# Patient Record
Sex: FEMALE | Race: WHITE | NOT HISPANIC OR LATINO | Employment: UNEMPLOYED | URBAN - METROPOLITAN AREA
[De-identification: names, ages, dates, MRNs, and addresses within clinical notes are randomized per-mention and may not be internally consistent; named-entity substitution may affect disease eponyms.]

---

## 2017-10-11 ENCOUNTER — HOSPITAL ENCOUNTER (EMERGENCY)
Facility: HOSPITAL | Age: 57
Discharge: HOME | End: 2017-10-11
Payer: COMMERCIAL

## 2017-10-11 VITALS
BODY MASS INDEX: 24.8 KG/M2 | HEIGHT: 63 IN | TEMPERATURE: 98 F | OXYGEN SATURATION: 98 % | RESPIRATION RATE: 16 BRPM | WEIGHT: 140 LBS | DIASTOLIC BLOOD PRESSURE: 76 MMHG | HEART RATE: 72 BPM | SYSTOLIC BLOOD PRESSURE: 176 MMHG

## 2017-10-11 DIAGNOSIS — M50.30 DEGENERATIVE DISC DISEASE, CERVICAL: Primary | ICD-10-CM

## 2017-10-11 DIAGNOSIS — M54.2 NECK PAIN: ICD-10-CM

## 2017-10-11 PROCEDURE — 63600175 PHARM REV CODE 636 W HCPCS: Performed by: EMERGENCY MEDICINE

## 2017-10-11 PROCEDURE — 96372 THER/PROPH/DIAG INJ SC/IM: CPT

## 2017-10-11 PROCEDURE — 99284 EMERGENCY DEPT VISIT MOD MDM: CPT | Mod: 25

## 2017-10-11 RX ORDER — KETOROLAC TROMETHAMINE 30 MG/ML
30 INJECTION, SOLUTION INTRAMUSCULAR; INTRAVENOUS
Status: COMPLETED | OUTPATIENT
Start: 2017-10-11 | End: 2017-10-11

## 2017-10-11 RX ORDER — METHYLPREDNISOLONE ACETATE 40 MG/ML
80 INJECTION, SUSPENSION INTRA-ARTICULAR; INTRALESIONAL; INTRAMUSCULAR; SOFT TISSUE
Status: COMPLETED | OUTPATIENT
Start: 2017-10-11 | End: 2017-10-11

## 2017-10-11 RX ORDER — TRAMADOL HYDROCHLORIDE 50 MG/1
50 TABLET ORAL EVERY 6 HOURS PRN
Qty: 20 TABLET | Refills: 0 | Status: SHIPPED | OUTPATIENT
Start: 2017-10-11 | End: 2017-10-21

## 2017-10-11 RX ADMIN — KETOROLAC TROMETHAMINE 30 MG: 30 INJECTION, SOLUTION INTRAMUSCULAR at 06:10

## 2017-10-11 RX ADMIN — METHYLPREDNISOLONE ACETATE 80 MG: 40 INJECTION, SUSPENSION INTRA-ARTICULAR; INTRALESIONAL; INTRAMUSCULAR; SOFT TISSUE at 06:10

## 2017-10-11 NOTE — ED PROVIDER NOTES
Encounter Date: 10/11/2017    SCRIBE #1 NOTE: I, Patricia Deena, am scribing for, and in the presence of, Dr. Soni.       History     Chief Complaint   Patient presents with    Neck Pain     pt c/o chronic neck pain that increased 2 days ago.      Time seen by provider: 6:30 PM    This is a 57 y.o. female with a PMHx of HTN and methadone dependence who presents with complaint of neck pain. The patient has a chronic neck injury. It has recently worsened over the last month, this pain has not been evaluated by orthopedics. The patient describes the pain as sharp pain in the left side of her neck and left shoulder. The patient denies any numbness or tingling in the fingers of her left arm. She does not feel as though her LUE is weak. The patient has no other complaints at this time.       The history is provided by the patient.     Review of patient's allergies indicates:  No Known Allergies  Past Medical History:   Diagnosis Date    Hypertension     Methadone dependence      Past Surgical History:   Procedure Laterality Date    CHOLECYSTECTOMY  5/18/14     Laparoscopic Cholecystectomy 5/18/14 [facility]    MASS EXCISION Right 1998    Thigh fatty cyst     History reviewed. No pertinent family history.  Social History   Substance Use Topics    Smoking status: Current Every Day Smoker     Packs/day: 0.25    Smokeless tobacco: Never Used    Alcohol use No     Review of Systems   Constitutional: Negative for chills and fever.   HENT: Negative for facial swelling.    Eyes: Negative for visual disturbance.   Respiratory: Negative for shortness of breath.    Cardiovascular: Negative for leg swelling.   Gastrointestinal: Negative for abdominal distention.   Genitourinary: Negative for hematuria.   Musculoskeletal: Positive for myalgias, neck pain and neck stiffness. Negative for back pain.   Skin: Negative for rash.   Neurological: Negative for speech difficulty, weakness and numbness.       Physical Exam      Initial Vitals [10/11/17 1703]   BP Pulse Resp Temp SpO2   (!) 152/69 71 18 98.1 °F (36.7 °C) 95 %      MAP       96.67         Physical Exam    Nursing note and vitals reviewed.  Constitutional: She appears well-developed and well-nourished. She is not diaphoretic. No distress.   HENT:   Head: Normocephalic and atraumatic.   Eyes: Conjunctivae are normal. No scleral icterus.   Neck: Normal range of motion. Neck supple.   Cardiovascular: Normal rate and intact distal pulses.   Pulmonary/Chest: No respiratory distress. She has no wheezes.   Abdominal: Soft. She exhibits no distension.   Musculoskeletal: Normal range of motion. She exhibits tenderness. She exhibits no edema.   Some left cervical paraspinal and trapezius tenderness. No thoracic or lumbar spinal tenderness. No midline tenderness.    Neurological: She is alert and oriented to person, place, and time. She has normal strength. No cranial nerve deficit or sensory deficit.   Strength and sensation to light touch intact.    Skin: Skin is warm and dry.   Psychiatric: She has a normal mood and affect.         ED Course   Procedures  Labs Reviewed - No data to display          Medical Decision Making:   History:   Old Medical Records: I decided to obtain old medical records.  Independently Interpreted Test(s):   I have ordered and independently interpreted X-rays - see summary below.       <> Summary of X-Ray Reading(s): Cervical x-rays independently interpreted by me demonstrate diffuse degenerative disc disease.  Clinical Tests:   Radiological Study: Ordered and Reviewed  ED Management:  Yu Meza is a 57 y.o. female who presents with  acute worsening of chronic neck pain.  She has no focal neurologic deficit; however, she does now have radicular symptoms x-rays revealed diffuse degenerative changes.  She is given Depo-Medrol and tramadol with referral to orthopedic surgery.                   ED Course      Clinical Impression:   The  encounter diagnosis was Neck pain.    Disposition:   Disposition: Discharged  Condition: Stable                        Byron Soni III, MD  10/11/17 5405

## 2017-10-12 NOTE — ED NOTES
Pt c/o chronic neck pain that has worsened in the past month, unrelieved per ibuprofen.  Pt denies trauma.

## 2017-10-16 ENCOUNTER — HOSPITAL ENCOUNTER (EMERGENCY)
Facility: HOSPITAL | Age: 57
Discharge: HOME | End: 2017-10-16
Attending: EMERGENCY MEDICINE
Payer: COMMERCIAL

## 2017-10-16 VITALS
OXYGEN SATURATION: 97 % | HEIGHT: 63 IN | HEART RATE: 113 BPM | SYSTOLIC BLOOD PRESSURE: 151 MMHG | TEMPERATURE: 97 F | WEIGHT: 150 LBS | BODY MASS INDEX: 26.58 KG/M2 | DIASTOLIC BLOOD PRESSURE: 89 MMHG | RESPIRATION RATE: 20 BRPM

## 2017-10-16 DIAGNOSIS — M62.838 SPASM OF CERVICAL PARASPINOUS MUSCLE: Primary | ICD-10-CM

## 2017-10-16 PROCEDURE — 25000003 PHARM REV CODE 250: Performed by: EMERGENCY MEDICINE

## 2017-10-16 PROCEDURE — 99283 EMERGENCY DEPT VISIT LOW MDM: CPT

## 2017-10-16 RX ORDER — CYCLOBENZAPRINE HCL 10 MG
10 TABLET ORAL
Status: COMPLETED | OUTPATIENT
Start: 2017-10-16 | End: 2017-10-16

## 2017-10-16 RX ORDER — CYCLOBENZAPRINE HCL 10 MG
10 TABLET ORAL 3 TIMES DAILY PRN
Qty: 15 TABLET | Refills: 0 | Status: SHIPPED | OUTPATIENT
Start: 2017-10-16 | End: 2017-10-21

## 2017-10-16 RX ORDER — OXYCODONE AND ACETAMINOPHEN 5; 325 MG/1; MG/1
2 TABLET ORAL
Status: COMPLETED | OUTPATIENT
Start: 2017-10-16 | End: 2017-10-16

## 2017-10-16 RX ADMIN — CYCLOBENZAPRINE HYDROCHLORIDE 10 MG: 10 TABLET, FILM COATED ORAL at 09:10

## 2017-10-16 RX ADMIN — OXYCODONE AND ACETAMINOPHEN 2 TABLET: 5; 325 TABLET ORAL at 09:10

## 2017-10-17 NOTE — ED TRIAGE NOTES
pt c/o neck spasms that began today.  Pt states she has chronic neck pain.  Pt denies trauma. Pt denies CP or SOB.  Increased pain with movement

## 2017-10-17 NOTE — ED PROVIDER NOTES
Encounter Date: 10/16/2017       History     Chief Complaint   Patient presents with    Neck Pain     pt c/o neck spasms that began today.  Pt states she has chronic neck pain.  Pt denies trauma. Pt denies CP or SOB     The patient presents emergency department with severe neck pain and spasm.  The patient states she was here last Thursday, 5 days ago and had a plain film x-ray that time.  She states she was told that she had bulging disks.  She was given Ultram.  The patient states that she is on methadone.  She used to have muscle relaxers but she is out of those.  No trauma.  No numbness tingling or weakness to any extremities.  No headache.          Review of patient's allergies indicates:  No Known Allergies  Past Medical History:   Diagnosis Date    Hypertension     Methadone dependence      Past Surgical History:   Procedure Laterality Date    CHOLECYSTECTOMY  5/18/14     [facility]oscopic [facility]cyst[facility] 5/18/14 [facility]    MASS EXCISION Right 1998    Thigh fatty cyst     History reviewed. No pertinent family history.  Social History   Substance Use Topics    Smoking status: Current Every Day Smoker     Packs/day: 0.25    Smokeless tobacco: Never Used    Alcohol use No     Review of Systems   Constitutional: Negative for fever.   HENT: Negative for sore throat.    Respiratory: Negative for shortness of breath.    Cardiovascular: Negative for chest pain.   Gastrointestinal: Negative for nausea.   Genitourinary: Negative for dysuria.   Musculoskeletal: Negative for back pain.   Skin: Negative for rash.   Neurological: Negative for weakness.   Hematological: Does not bruise/bleed easily.       Physical Exam     Initial Vitals [10/16/17 2006]   BP Pulse Resp Temp SpO2   (!) 151/89 (!) 113 20 97.1 °F (36.2 °C) 97 %      MAP       109.67         Physical Exam    Nursing note and vitals reviewed.  Constitutional: She appears well-developed and well-nourished.   HENT:   Head: Normocephalic and atraumatic.    Neck: Neck supple.   Severe muscular tenderness to the left paraspinous muscles and the left trapezius muscles.  Decreased range of motion when turning her head from side to side   Abdominal: Soft. There is no tenderness.   Musculoskeletal: Normal range of motion.   Lymphadenopathy:     She has no cervical adenopathy.   Neurological: She is alert and oriented to person, place, and time. She has normal strength.   Skin: Skin is warm and dry.   Psychiatric: She has a normal mood and affect. Her behavior is normal. Judgment and thought content normal.         ED Course   Procedures  Labs Reviewed - No data to display          Medical Decision Making:   ED Management:  Patient was given Percocet 10 mg and Flexeril 10 mg.  She was given a copy of her previous C-spine x-ray report.  She'll be given a prescription for Flexeril to take at home.                   ED Course      Clinical Impression:   The encounter diagnosis was Spasm of cervical paraspinous muscle.                           Gardenia Perez MD  10/16/17 3434

## 2017-11-08 ENCOUNTER — TELEPHONE (OUTPATIENT)
Dept: SPINE | Facility: CLINIC | Age: 57
End: 2017-11-08

## 2017-11-08 DIAGNOSIS — M54.2 CERVICALGIA: Primary | ICD-10-CM

## 2017-11-10 ENCOUNTER — HOSPITAL ENCOUNTER (OUTPATIENT)
Dept: RADIOLOGY | Facility: OTHER | Age: 57
Discharge: HOME | End: 2017-11-10
Attending: PHYSICIAN ASSISTANT
Payer: MEDICAID

## 2017-11-10 ENCOUNTER — OFFICE VISIT (OUTPATIENT)
Dept: SPINE | Facility: CLINIC | Age: 57
End: 2017-11-10
Payer: MEDICAID

## 2017-11-10 VITALS
BODY MASS INDEX: 26.58 KG/M2 | DIASTOLIC BLOOD PRESSURE: 77 MMHG | SYSTOLIC BLOOD PRESSURE: 161 MMHG | HEART RATE: 80 BPM | HEIGHT: 63 IN | WEIGHT: 150 LBS

## 2017-11-10 DIAGNOSIS — M50.30 DEGENERATION OF CERVICAL INTERVERTEBRAL DISC: ICD-10-CM

## 2017-11-10 DIAGNOSIS — M47.812 CERVICAL SPONDYLOSIS WITHOUT MYELOPATHY: ICD-10-CM

## 2017-11-10 DIAGNOSIS — M54.2 NECK PAIN: ICD-10-CM

## 2017-11-10 DIAGNOSIS — M43.10 ACQUIRED SPONDYLOLISTHESIS: ICD-10-CM

## 2017-11-10 DIAGNOSIS — M54.2 CERVICALGIA: ICD-10-CM

## 2017-11-10 PROCEDURE — 99999 PR PBB SHADOW E&M-EST. PATIENT-LVL IV: CPT | Mod: PBBFAC,,, | Performed by: PHYSICIAN ASSISTANT

## 2017-11-10 PROCEDURE — 99204 OFFICE O/P NEW MOD 45 MIN: CPT | Mod: S$PBB,,, | Performed by: PHYSICIAN ASSISTANT

## 2017-11-10 PROCEDURE — 72040 X-RAY EXAM NECK SPINE 2-3 VW: CPT | Mod: 26,,, | Performed by: RADIOLOGY

## 2017-11-10 PROCEDURE — 99214 OFFICE O/P EST MOD 30 MIN: CPT | Mod: PBBFAC,25 | Performed by: PHYSICIAN ASSISTANT

## 2017-11-10 PROCEDURE — 72040 X-RAY EXAM NECK SPINE 2-3 VW: CPT | Mod: TC

## 2017-11-10 RX ORDER — ESTRADIOL 10 UG/1
INSERT VAGINAL
COMMUNITY

## 2017-11-10 RX ORDER — LISINOPRIL 10 MG/1
10 TABLET ORAL DAILY
COMMUNITY

## 2017-11-10 RX ORDER — DICLOFENAC SODIUM 75 MG/1
75 TABLET, DELAYED RELEASE ORAL 2 TIMES DAILY PRN
Qty: 180 TABLET | Refills: 0 | Status: SHIPPED | OUTPATIENT
Start: 2017-11-10

## 2017-11-10 RX ORDER — ESCITALOPRAM OXALATE 10 MG/1
10 TABLET ORAL DAILY
COMMUNITY

## 2017-11-10 RX ORDER — METHOCARBAMOL 750 MG/1
750 TABLET, FILM COATED ORAL 3 TIMES DAILY PRN
Qty: 270 TABLET | Refills: 0 | Status: SHIPPED | OUTPATIENT
Start: 2017-11-10 | End: 2017-12-10

## 2017-11-10 RX ORDER — METHYLPREDNISOLONE 4 MG/1
TABLET ORAL
Qty: 1 PACKAGE | Refills: 0 | Status: ON HOLD | OUTPATIENT
Start: 2017-11-10 | End: 2019-11-12 | Stop reason: HOSPADM

## 2017-11-10 NOTE — PROGRESS NOTES
Subjective:     Patient ID:  Yu Meza is a 57 y.o. female.    Self-Referral    Chief Complaint: Neck pain    HPI    Yu Meza is a 57 y.o. female who presents with the above CC.  Patient has had neck pain for the past 2 years that has gotten progressively worse over the past three months.  Pain is constant rated 7/10 and is worse with flexion and turning and no position makes it better.  Pain radiates to the left shoulder region.  No arm pain.    Patient has not had PT or ESIs.  No spine surgery.  Patient is currently taking OTC ibuprofen and tylenol.  Gabapentin 300mg once a day which helps.  She has been taking methadone for the past 30 years and goes to the [facility] clinic on the [facility].      Patient denies any recent accidents or trauma, no saddle anesthesias, and no bowel or bladder incontinence.    Patient denies any difficulty with balance or gait, no difficulty tying shoes or buttoning clothes, is occasionally dropping things, does not have difficulty opening containers, and has had no change in handwriting.      Review of Systems:  Please refer to page three of the spine center intake form for a complete review of systems.    Past Medical History:   Diagnosis Date    Hypertension     Methadone dependence      Past Surgical History:   Procedure Laterality Date    CHOLECYSTECTOMY  5/18/14     Laparoscopic Cholecystectomy 5/18/14 [facility] [place]    MASS EXCISION Right 1998    Thigh fatty cyst     Current Outpatient Prescriptions on File Prior to Visit   Medication Sig Dispense Refill    methadone (DOLOPHINE) 5 MG tablet Take 90 mg by mouth once daily.       [DISCONTINUED] ibuprofen (ADVIL,MOTRIN) 800 MG tablet Take 1 tablet (800 mg total) by mouth every 6 (six) hours as needed for Pain. 30 tablet 1     No current facility-administered medications on file prior to visit.      Review of patient's allergies indicates:  No Known Allergies  Social History     Social History     "Marital status:      Spouse name: N/A    Number of children: N/A    Years of education: N/A     Occupational History    Not on file.     Social History Main Topics    Smoking status: Current Every Day Smoker     Packs/day: 0.25    Smokeless tobacco: Never Used    Alcohol use No    Drug use: No    Sexual activity: Yes     Partners: Male     Other Topics Concern    Not on file     Social History Narrative    No narrative on file     History reviewed. No pertinent family history.    Objective:      Vitals:    11/10/17 0850   BP: (!) 161/77   Pulse: 80   Weight: 68 kg (150 lb)   Height: 5' 3" (1.6 m)   PainSc:   7   PainLoc: Neck         Physical Exam:    General:  Yu Meza is well-developed, well-nourished, appears stated age, in no acute distress, alert and oriented to person, place, and time.    Pulmonary/Chest:  Respiratory effort normal  Abdominal: Exhibits no distension  Psychiatric:  Normal mood and affect.  Behavior is normal.  Judgement and thought content normal    Musculoskeletal:    Patient arises from a sitting to standing position without difficulty.  Patient walks to the door without evidence of limp, pain, or abnormality of gait. Patient is able to walk heel to toe without difficulty.    Cervical ROM:   Pain in cervical spine flexion, extension, right lateral bending, left lateral bending.  No pain in right rotation and left rotation.    Cervical Spine Inspection:  Normal with no surgical scars with no visible rashes.    Cervical Spine Palpation:  No tenderness to cervical spine palpation.    Neurological: Alert and oriented to person, place, and time    Muscle strength against resistance:     Right Left   Deltoid  5 / 5 5 / 5   Biceps  5 / 5 5 / 5   Triceps 5 / 5 5 / 5   Wrist flexion  5 / 5 5 / 5   Wrist extension 5 / 5 5 / 5   Finger abduction 5 / 5 5 / 5   Thumb opposition 5 / 5 5 / 5   Handgrip 5 / 5 5 / 5   Hip flexion  5 / 5 5 / 5   Hip extension 5 / 5 5 / 5   Hip " abduction 5 / 5 5 / 5   Hip adduction 5/ 5 5 / 5   Knee extension  5 / 5 5 / 5   Knee flexion  5 / 5 5 / 5   Dorsiflexion  5 / 5 5 / 5   EHL  5 / 5 5 / 5   Plantar flexion  5 / 5 5 / 5   Inversion of the feet 5 / 5 5 / 5   Eversion of the feet 5 / 5 5 / 5     Reflexes:     Right Left   Triceps 2+ 2+   Biceps 2+ 2+   Brachioradialis 2+ 2+   Patellar 2+ 2+   Achilles 2+ 2+     Babinski: Negative bilaterally  Clonus:  Negative bilaterally  Torres: Positive bilaterally    On gross examination of the bilateral lower extremities, patient has full painfree ROM with no signs of clubbing, cyanosis, edema, and weakness.     XRAY Interpretation:     Cervical spine ap/lateral/flexion/extension xrays were personally reviewed today.  No fractures.  Movement at C3-4 and C4-5 on flexion and extension and grade one spondylolisthesis.   DDD at C5-6 and C6-7.  Multilevel spondylosis.    Assessment:          1. Cervical spondylosis without myelopathy    2. Degeneration of cervical intervertebral disc    3. Neck pain    4. Acquired spondylolisthesis            Plan:          Orders Placed This Encounter    MRI Cervical Spine Without Contrast    Ambulatory referral to Physical Therapy - Cervical    methylPREDNISolone (MEDROL DOSEPACK) 4 mg tablet    methocarbamol (ROBAXIN) 750 MG Tab    diclofenac (VOLTAREN) 75 MG EC tablet       C3-4, C4-5 grade one spondylolisthesis with movement.  DDD/spondylosis at C5-6 and C6-7    -MRI cervical spine  -PT through [facility]  -Medrol pack now with food  -Robaxin PRN with food  -Diclofenac PRN with food once the medrol pack is done  -She was told to ask her PCP about these medication interacting with methadone and harvoni treatment before she starts to take them and she verbalized understanding  -Fu after MRI    Follow-Up:  Return in 2 weeks (on 11/24/2017). If there are any questions prior to this, the patient was instructed to contact the office.       Thea Early [facility],  WILLIAN  Neurosurgery  [facility]  [facility]

## 2017-11-15 ENCOUNTER — HOSPITAL ENCOUNTER (OUTPATIENT)
Dept: RADIOLOGY | Facility: HOSPITAL | Age: 57
Discharge: HOME | End: 2017-11-15
Attending: PHYSICIAN ASSISTANT
Payer: MEDICAID

## 2017-11-15 DIAGNOSIS — M54.2 NECK PAIN: ICD-10-CM

## 2017-11-15 DIAGNOSIS — M43.10 ACQUIRED SPONDYLOLISTHESIS: ICD-10-CM

## 2017-11-15 DIAGNOSIS — M50.30 DEGENERATION OF CERVICAL INTERVERTEBRAL DISC: ICD-10-CM

## 2017-11-15 DIAGNOSIS — M47.812 CERVICAL SPONDYLOSIS WITHOUT MYELOPATHY: ICD-10-CM

## 2017-11-15 PROCEDURE — 72141 MRI NECK SPINE W/O DYE: CPT | Mod: TC

## 2017-11-15 PROCEDURE — 72141 MRI NECK SPINE W/O DYE: CPT | Mod: 26,,, | Performed by: RADIOLOGY

## 2017-12-05 ENCOUNTER — TELEPHONE (OUTPATIENT)
Dept: SPINE | Facility: CLINIC | Age: 57
End: 2017-12-05

## 2017-12-05 NOTE — TELEPHONE ENCOUNTER
----- Message from Korin Grant sent at 12/5/2017  2:24 PM CST -----  X_  1st Request  _  2nd Request  _  3rd Request        Who:CHRISTINA CLAY [[id number]]     Why: Requesting a call back in regards to getting the results of her MRI. Please call to discuss.    What Number to Call Back:[phone]    When to Expect a call back: (Within 24 hours)    Please return the call at earliest convenience. Thanks!

## 2017-12-06 NOTE — TELEPHONE ENCOUNTER
Per my last clinic note it says FU after MRI.    Thea Early, PAULINA, PA-C  Neurosurgery  [facility] Sp[facility]  [facility]

## 2017-12-12 ENCOUNTER — OFFICE VISIT (OUTPATIENT)
Dept: SPINE | Facility: CLINIC | Age: 57
End: 2017-12-12
Payer: COMMERCIAL

## 2017-12-12 ENCOUNTER — TELEPHONE (OUTPATIENT)
Dept: NEUROSURGERY | Facility: CLINIC | Age: 57
End: 2017-12-12

## 2017-12-12 VITALS
WEIGHT: 150 LBS | HEIGHT: 63 IN | HEART RATE: 81 BPM | SYSTOLIC BLOOD PRESSURE: 132 MMHG | DIASTOLIC BLOOD PRESSURE: 71 MMHG | BODY MASS INDEX: 26.58 KG/M2

## 2017-12-12 DIAGNOSIS — M47.812 CERVICAL SPONDYLOSIS WITHOUT MYELOPATHY: ICD-10-CM

## 2017-12-12 DIAGNOSIS — M50.20 HNP (HERNIATED NUCLEUS PULPOSUS), CERVICAL: Primary | ICD-10-CM

## 2017-12-12 DIAGNOSIS — M54.2 NECK PAIN: ICD-10-CM

## 2017-12-12 DIAGNOSIS — M48.02 CERVICAL STENOSIS OF SPINAL CANAL: ICD-10-CM

## 2017-12-12 DIAGNOSIS — M50.30 DEGENERATION OF CERVICAL INTERVERTEBRAL DISC: ICD-10-CM

## 2017-12-12 DIAGNOSIS — M43.10 ACQUIRED SPONDYLOLISTHESIS: ICD-10-CM

## 2017-12-12 PROCEDURE — 99214 OFFICE O/P EST MOD 30 MIN: CPT | Mod: S$GLB,,, | Performed by: PHYSICIAN ASSISTANT

## 2017-12-12 PROCEDURE — 99999 PR PBB SHADOW E&M-EST. PATIENT-LVL IV: CPT | Mod: PBBFAC,,, | Performed by: PHYSICIAN ASSISTANT

## 2017-12-12 RX ORDER — CYCLOBENZAPRINE HCL 10 MG
10 TABLET ORAL 3 TIMES DAILY PRN
Qty: 90 TABLET | Refills: 2 | Status: SHIPPED | OUTPATIENT
Start: 2017-12-12 | End: 2017-12-22

## 2017-12-12 NOTE — PROGRESS NOTES
"Subjective:     Patient ID:  Yu Meza is a 57 y.o. female.      Chief Complaint:     HPI    Yu Meza is a 57 y.o. female who presents for MRI follow up.    Patient has had neck pain for the past 2 years that has gotten progressively worse over the past three months.  Pain is constant rated 7/10 and is worse with flexion and turning and no position makes it better.  Pain radiates to the left shoulder region.  No arm pain.     Patient has not had PT or ESIs.  No spine surgery.  Patient is currently taking OTC ibuprofen and tylenol.  Gabapentin 300mg once a day which helps.  She has been taking methadone for the past 30 years and goes [facility] [place] [facility] [place] [facility]  She was aftraid to start the diclofenac for fear of stroke.  The medrol pack did not help much.  The Robaxin gave her restless leg.    She has not started the PT yet.     Patient denies any recent accidents or trauma, no saddle anesthesias, and no bowel or bladder incontinence.     Patient denies any difficulty with balance or gait, no difficulty tying shoes or buttoning clothes, is occasionally dropping things, does not have difficulty opening containers, and has had no change in handwriting.     Review of Systems:  Constitution: Negative for chills, fever, night sweats and weight loss.   Musculoskeletal: Negative for falls.   Gastrointestinal: Negative for bowel incontinence, nausea and vomiting.   Genitourinary: Negative for bladder incontinence.   Neurological: Negative for disturbances in coordination and loss of balance.      Objective:      Vitals:    12/12/17 1016   BP: 132/71   Pulse: 81   Weight: 68 kg (150 lb)   Height: 5' 3" (1.6 m)   PainSc:   7   PainLoc: Neck         Physical Exam:    General:  Yu Meza is well-developed, well-nourished, appears stated age, in no acute distress, alert and oriented to person, place, and time.    Patient sits comfortably in the exam room and answers questions " appropriately. Grossly patient is able to move bilateral upper extremities without difficulty.     XRAY Interpretation:      Cervical spine ap/lateral/flexion/extension xrays were personally reviewed today.  No fractures.  Movement at C3-4 and C4-5 on flexion and extension and grade one spondylolisthesis.   DDD at C5-6 and C6-7.  Multilevel spondylosis.    MRI Interpretation:     Cervical spine MRI was personally reviewed today. C3-4 and C4-5 grade one spondylolisthesis.   DDD at C5-6 and C6-7.  Multilevel spondylosis.  There is central stenosis with spinal cord compression at C3-4 to C5-6 with HNP at those levels        Assessment:          1. HNP (herniated nucleus pulposus), cervical    2. Cervical spondylosis without myelopathy    3. Degeneration of cervical intervertebral disc    4. Cervical stenosis of spinal canal    5. Neck pain    6. Acquired spondylolisthesis            Plan:          Orders Placed This Encounter    Procedure Order to [facility]    cyclobenzaprine (FLEXERIL) 10 MG tablet     Movement at C3-4 and C4-5 on flexion and extension and grade one spondylolisthesis. Central stenosis with spinal cord compression at C3-4 to C5-6 with HNP at those levels.    -Start PT  -Start Flexeril PRN  -Start Diclofenac PRN with food  -C7-T1 IL PAULY through the pain clinic  -Refer to Dr. Sr for surgical recommendations      Follow-Up:  Return if symptoms worsen or fail to improve. If there are any questions prior to this, the patient was instructed to contact the office.       Thea Early, [facility], PA-C  Neurosurgery  Back and Spine Center  [facility]

## 2017-12-12 NOTE — TELEPHONE ENCOUNTER
Called patient , patient did not answer left message on voicemail     ----- Message from Yanet Ramesh RN sent at 12/12/2017  3:05 PM CST -----  Please call and let them know the appt has been scheduled for them by request of KENYATTA Hong. Letter has been printed and mailed.  ----- Message -----  From: Yeison Sr MD  Sent: 12/12/2017   1:49 PM  To: Yanet Ramesh RN    Ok sounds good. Thanks    ----- Message -----  From: Yanet Ramesh RN  Sent: 12/12/2017  11:02 AM  To: Yeison Sr MD    Please see below.  ----- Message -----  From: Astrid Llanos  Sent: 12/12/2017  10:45 AM  To: [facility] Staff    Please schedule with in 3 weeks for cervical spine surgery consult. Thank you

## 2017-12-27 ENCOUNTER — SURGERY (OUTPATIENT)
Age: 57
End: 2017-12-27

## 2017-12-27 ENCOUNTER — HOSPITAL ENCOUNTER (OUTPATIENT)
Facility: OTHER | Age: 57
Discharge: HOME | End: 2017-12-27
Attending: ANESTHESIOLOGY | Admitting: ANESTHESIOLOGY
Payer: MEDICAID

## 2017-12-27 VITALS
HEART RATE: 79 BPM | TEMPERATURE: 98 F | BODY MASS INDEX: 26.58 KG/M2 | SYSTOLIC BLOOD PRESSURE: 113 MMHG | RESPIRATION RATE: 18 BRPM | DIASTOLIC BLOOD PRESSURE: 61 MMHG | WEIGHT: 150 LBS | OXYGEN SATURATION: 95 % | HEIGHT: 63 IN

## 2017-12-27 DIAGNOSIS — M54.16 LUMBAR RADICULOPATHY: ICD-10-CM

## 2017-12-27 DIAGNOSIS — M54.12 CERVICAL RADICULOPATHY: Primary | ICD-10-CM

## 2017-12-27 PROCEDURE — 62321 NJX INTERLAMINAR CRV/THRC: CPT | Mod: ,,, | Performed by: ANESTHESIOLOGY

## 2017-12-27 PROCEDURE — 62321 NJX INTERLAMINAR CRV/THRC: CPT | Performed by: ANESTHESIOLOGY

## 2017-12-27 PROCEDURE — 25000003 PHARM REV CODE 250: Performed by: ANESTHESIOLOGY

## 2017-12-27 PROCEDURE — 25500020 PHARM REV CODE 255: Performed by: ANESTHESIOLOGY

## 2017-12-27 PROCEDURE — 63600175 PHARM REV CODE 636 W HCPCS: Performed by: ANESTHESIOLOGY

## 2017-12-27 PROCEDURE — 62320 NJX INTERLAMINAR CRV/THRC: CPT | Performed by: ANESTHESIOLOGY

## 2017-12-27 RX ORDER — DEXAMETHASONE SODIUM PHOSPHATE 10 MG/ML
INJECTION INTRAMUSCULAR; INTRAVENOUS
Status: DISCONTINUED | OUTPATIENT
Start: 2017-12-27 | End: 2017-12-27 | Stop reason: HOSPADM

## 2017-12-27 RX ORDER — LIDOCAINE HYDROCHLORIDE 10 MG/ML
INJECTION INFILTRATION; PERINEURAL
Status: DISCONTINUED | OUTPATIENT
Start: 2017-12-27 | End: 2017-12-27 | Stop reason: HOSPADM

## 2017-12-27 RX ORDER — MIDAZOLAM HYDROCHLORIDE 1 MG/ML
INJECTION INTRAMUSCULAR; INTRAVENOUS
Status: DISCONTINUED | OUTPATIENT
Start: 2017-12-27 | End: 2017-12-27 | Stop reason: HOSPADM

## 2017-12-27 RX ORDER — SODIUM CHLORIDE 9 MG/ML
INJECTION, SOLUTION INTRAVENOUS CONTINUOUS
Status: DISCONTINUED | OUTPATIENT
Start: 2017-12-27 | End: 2017-12-27 | Stop reason: HOSPADM

## 2017-12-27 RX ORDER — BUPIVACAINE HYDROCHLORIDE 2.5 MG/ML
INJECTION, SOLUTION EPIDURAL; INFILTRATION; INTRACAUDAL
Status: DISCONTINUED | OUTPATIENT
Start: 2017-12-27 | End: 2017-12-27 | Stop reason: HOSPADM

## 2017-12-27 RX ADMIN — SODIUM CHLORIDE: 0.9 INJECTION, SOLUTION INTRAVENOUS at 03:12

## 2017-12-27 RX ADMIN — BUPIVACAINE HYDROCHLORIDE 10 ML: 2.5 INJECTION, SOLUTION EPIDURAL; INFILTRATION; INTRACAUDAL; PERINEURAL at 03:12

## 2017-12-27 RX ADMIN — LIDOCAINE HYDROCHLORIDE 10 ML: 10 INJECTION, SOLUTION INFILTRATION; PERINEURAL at 03:12

## 2017-12-27 RX ADMIN — DEXAMETHASONE SODIUM PHOSPHATE 10 MG: 10 INJECTION, SOLUTION INTRAMUSCULAR; INTRAVENOUS at 03:12

## 2017-12-27 RX ADMIN — MIDAZOLAM HYDROCHLORIDE 3 MG: 1 INJECTION, SOLUTION INTRAMUSCULAR; INTRAVENOUS at 03:12

## 2017-12-27 RX ADMIN — IOHEXOL 5 ML: 300 INJECTION, SOLUTION INTRAVENOUS at 03:12

## 2017-12-27 NOTE — OP NOTE
Cervical Interlaminar Epidural Steroid Injection under Fluoroscopic Guidance.   Time-out taken to identify patient and procedure prior to starting the procedure.     Date of Procedure: 12/27/2017    PROCEDURE: Cervical Interlaminar epidural steroid injection C7/T1 under fluoroscopic guidance.     Pre-Op diagnosis: Cervical spondylosis without myelopathy [M47.812]  Cervical radiculopathy    Post-Op diagnosis: Cervical spondylosis without myelopathy [M47.812]  Cervical radiculopathy    PHYSICIAN: OSMAR STANLEY     Assistants:  Mack Moran MD PGY-3  I was present and supervising all critical portions of the procedure      MEDICATIONS INJECTED: Preservative-free Decadron 10 mg with 1mL of sterile 0.25%Bupivicaine and 3ml of preservative free normal saline.     LOCAL ANESTHETIC INJECTED: Xylocaine 1% 3mL    ESTIMATED BLOOD LOSS: none.     COMPLICATIONS: none.     TECHNIQUE: With the patient laying in a prone position, the area was prepped and draped in the usual sterile fashion using ChloraPrep and a fenestrated drape. Local anesthetic was given using a 27-gauge needle by raising a wheal and going down to the hub of the needle over the C7/T1 interlaminar space.  The interlaminar space was then approached with a 3.5 inch 18-gauge Touhy needle was introduced under fluoroscopic guidance in the AP and Lateral view. Once the Ligamentum flavum was encountered loss of resistance to saline was used to enter the epidural space. With positive loss of resistance and negative CSF or Blood, 2mL contrast dye Omnipaque (300mg/ml) was injected to confirm placement and there was no vascular runoff. The medication was then injected slowly. The patient tolerated the procedure well.     Conscious sedation provided by M.D    The patient was monitored with continuous pulse oximetry, EKG, and intermittent blood pressure monitors.  The patient was hemodynamically stable throughout the entire process was responsive to voice, and breathing  spontaneously.  Supplemental O2 was provided at 2L/min via nasal cannula.  Patient was comfortable for the duration of the procedure. (See nurse documentation and case log for sedation time)    There was a total of 3 mg IV Midazolam was titrated for the procedure        The patient was monitored after the procedure.   They were given post-procedure and discharge instructions to follow at home.  The patient was discharged in a stable condition.

## 2017-12-27 NOTE — DISCHARGE SUMMARY
Discharge Note  Short Stay      SUMMARY     Admit Date: 12/27/2017    Attending Physician: Sera Call    Discharge Diagnosis: Cervical spondylosis without myelopathy [M47.812]    Discharge Physician: Sera Call      Discharge Date: 12/27/2017 3:36 PM     PROCEDURE: Cervical Interlaminar epidural steroid injection C7/T1 under fluoroscopic guidance.     Pre-Op diagnosis: Cervical spondylosis without myelopathy [M47.812]  Cervical radiculopathy    Post-Op diagnosis: Cervical spondylosis without myelopathy [M47.812]  Cervical radiculopathy    Disposition: Home or self care    Patient Instructions:   Current Discharge Medication List      CONTINUE these medications which have NOT CHANGED    Details   diclofenac (VOLTAREN) 75 MG EC tablet Take 1 tablet (75 mg total) by mouth 2 (two) times daily as needed.  Qty: 180 tablet, Refills: 0    Associated Diagnoses: Cervical spondylosis without myelopathy; Degeneration of cervical intervertebral disc; Neck pain; Acquired spondylolisthesis      escitalopram oxalate (LEXAPRO) 10 MG tablet Take 10 mg by mouth once daily.      estradiol 10 mcg Tab Place vaginally.      lisinopril 10 MG tablet Take 10 mg by mouth once daily.      methadone (DOLOPHINE) 5 MG tablet Take 90 mg by mouth once daily.       methylPREDNISolone (MEDROL DOSEPACK) 4 mg tablet Take as directed  Qty: 1 Package, Refills: 0    Associated Diagnoses: Cervical spondylosis without myelopathy; Degeneration of cervical intervertebral disc; Neck pain; Acquired spondylolisthesis             Resume home diet and activity

## 2017-12-27 NOTE — DISCHARGE INSTRUCTIONS
Home Care Instructions Pain Management:    1. DIET:   You may resume your normal diet today.   2. BATHING:   You may shower with luke warm water.  3. DRESSING:   You may remove your bandage today.   4. ACTIVITY LEVEL:   You may resume your normal activities 24 hrs after your procedure.  5. MEDICATIONS:   You may resume your normal medications today.   6. SPECIAL INSTRUCTIONS:   No heat to the injection site for 24 hrs including, bath or shower, heating pad, moist heat, or hot tubs.    Use ice pack to injection site for any pain or discomfort.  Apply ice packs for 20 minute intervals as needed.   If you have received any sedatives by mouth today you may not drive for 12 hours.    If you have received any sedation through your IV, you may not drive for 24 hrs.     PLEASE CALL YOUR DOCTOR IF:  1. Redness or swelling around the injection site.  2. Fever of 101 degrees  3. Drainage (pus) from the injection site.  4. For any continuous bleeding (some dried blood over the incision is normal.)    FOR EMERGENCIES:   If any unusual problems or difficulties occur during clinic hours, call [phone] or [phone].     Thank you for allowing us to care for you today. You may receive a survey about the care we provided. Your feedback is valuable and helps us provide excellent care throughout the community.

## 2017-12-27 NOTE — H&P (VIEW-ONLY)
"Subjective:     Patient ID:  Yu Meza is a 57 y.o. female.      Chief Complaint:     HPI    Yu Meza is a 57 y.o. female who presents for MRI follow up.    Patient has had neck pain for the past 2 years that has gotten progressively worse over the past three months.  Pain is constant rated 7/10 and is worse with flexion and turning and no position makes it better.  Pain radiates to the left shoulder region.  No arm pain.     Patient has not had PT or ESIs.  No spine surgery.  Patient is currently taking OTC ibuprofen and tylenol.  Gabapentin 300mg once a day which helps.  She has been taking methadone for the past 30 years and goes to the [facility] on the [facility].  She was aftraid to start the diclofenac for fear of stroke.  The medrol pack did not help much.  The Robaxin gave her restless leg.    She has not started the PT yet.     Patient denies any recent accidents or trauma, no saddle anesthesias, and no bowel or bladder incontinence.     Patient denies any difficulty with balance or gait, no difficulty tying shoes or buttoning clothes, is occasionally dropping things, does not have difficulty opening containers, and has had no change in handwriting.     Review of Systems:  Constitution: Negative for chills, fever, night sweats and weight loss.   Musculoskeletal: Negative for falls.   Gastrointestinal: Negative for bowel incontinence, nausea and vomiting.   Genitourinary: Negative for bladder incontinence.   Neurological: Negative for disturbances in coordination and loss of balance.      Objective:      Vitals:    12/12/17 1016   BP: 132/71   Pulse: 81   Weight: 68 kg (150 lb)   Height: 5' 3" (1.6 m)   PainSc:   7   PainLoc: Neck         Physical Exam:    General:  Yu Meza is well-developed, well-nourished, appears stated age, in no acute distress, alert and oriented to person, place, and time.    Patient sits comfortably in the exam room and answers questions " appropriately. Grossly patient is able to move bilateral upper extremities without difficulty.     XRAY Interpretation:      Cervical spine ap/lateral/flexion/extension xrays were personally reviewed today.  No fractures.  Movement at C3-4 and C4-5 on flexion and extension and grade one spondylolisthesis.   DDD at C5-6 and C6-7.  Multilevel spondylosis.    MRI Interpretation:     Cervical spine MRI was personally reviewed today. C3-4 and C4-5 grade one spondylolisthesis.   DDD at C5-6 and C6-7.  Multilevel spondylosis.  There is central stenosis with spinal cord compression at C3-4 to C5-6 with HNP at those levels        Assessment:          1. HNP (herniated nucleus pulposus), cervical    2. Cervical spondylosis without myelopathy    3. Degeneration of cervical intervertebral disc    4. Cervical stenosis of spinal canal    5. Neck pain    6. Acquired spondylolisthesis            Plan:          Orders Placed This Encounter    Procedure Order to [facility]    cyclobenzaprine (FLEXERIL) 10 MG tablet     Movement at C3-4 and C4-5 on flexion and extension and grade one spondylolisthesis. Central stenosis with spinal cord compression at C3-4 to C5-6 with HNP at those levels.    -Start PT  -Start Flexeril PRN  -Start Diclofenac PRN with food  -C7-T1 IL PAULY through the pain clinic  -Refer to Dr. Sr for surgical recommendations      Follow-Up:  Return if symptoms worsen or fail to improve. If there are any questions prior to this, the patient was instructed to contact the office.       Thea Early, [facility], PA-C  Neurosurgery  [facility]  [facility]

## 2017-12-27 NOTE — INTERVAL H&P NOTE
"The patient has been examined and the H&P has been reviewed:    I concur with the findings and no changes have occurred since H&P was written.    Anesthesia/Surgery risks, benefits and alternative options discussed and understood by patient/family.      HPI  Here for cervical PAULY    PMHx, PSHx, Allergies, Medications reviewed in epic    ROS negative except pain complaints in HPI    OBJECTIVE:    BP (!) 180/92   Pulse 90   Temp 98.2 °F (36.8 °C) (Oral)   Resp 18   Ht 5' 3" (1.6 m)   Wt 68 kg (150 lb)   SpO2 95%   BMI 26.57 kg/m²     PHYSICAL EXAMINATION:    GENERAL: Well appearing, in no acute distress, alert and oriented x3.  PSYCH:  Mood and affect appropriate.  SKIN: Skin color, texture, turgor normal, no rashes or lesions.  CV: RRR with palpation of the radial artery.  PULM: No evidence of respiratory difficulty, symmetric chest rise. Clear to auscultation.  NEURO: Cranial nerves grossly intact.    Plan:    Proceed with procedure as planned    Mack Moran  12/27/2017      There are no hospital problems to display for this patient.    "

## 2018-01-03 ENCOUNTER — TELEPHONE (OUTPATIENT)
Dept: SPINE | Facility: CLINIC | Age: 58
End: 2018-01-03

## 2018-01-03 NOTE — TELEPHONE ENCOUNTER
Called to confirm patients 1/4 appointment at 10:45 , the patient did not answer . I left message on voicemail

## 2018-04-26 ENCOUNTER — HOSPITAL ENCOUNTER (OUTPATIENT)
Dept: RADIOLOGY | Facility: OTHER | Age: 58
Discharge: HOME | End: 2018-04-26
Attending: NURSE PRACTITIONER
Payer: MEDICAID

## 2018-04-26 DIAGNOSIS — M25.552 LEFT HIP PAIN: ICD-10-CM

## 2018-04-26 DIAGNOSIS — M25.552 LEFT HIP PAIN: Primary | ICD-10-CM

## 2018-04-26 PROCEDURE — 73502 X-RAY EXAM HIP UNI 2-3 VIEWS: CPT | Mod: 26,LT,, | Performed by: RADIOLOGY

## 2018-04-26 PROCEDURE — 73502 X-RAY EXAM HIP UNI 2-3 VIEWS: CPT | Mod: TC,FY,LT

## 2019-11-09 ENCOUNTER — ANESTHESIA EVENT (OUTPATIENT)
Dept: EMERGENCY MEDICINE | Facility: HOSPITAL | Age: 59
DRG: 391 | End: 2019-11-09
Payer: MEDICAID

## 2019-11-09 ENCOUNTER — HOSPITAL ENCOUNTER (INPATIENT)
Facility: HOSPITAL | Age: 59
LOS: 3 days | Discharge: HOME | DRG: 391 | End: 2019-11-12
Attending: EMERGENCY MEDICINE | Admitting: HOSPITALIST
Payer: MEDICAID

## 2019-11-09 ENCOUNTER — ANESTHESIA (OUTPATIENT)
Dept: EMERGENCY MEDICINE | Facility: HOSPITAL | Age: 59
DRG: 391 | End: 2019-11-09
Payer: MEDICAID

## 2019-11-09 DIAGNOSIS — Z95.828 S/P PICC CENTRAL LINE PLACEMENT: ICD-10-CM

## 2019-11-09 DIAGNOSIS — K52.9 ILEITIS: Primary | ICD-10-CM

## 2019-11-09 DIAGNOSIS — N17.0 ACUTE RENAL FAILURE WITH TUBULAR NECROSIS: ICD-10-CM

## 2019-11-09 DIAGNOSIS — R74.8 ABNORMAL LIVER ENZYMES: ICD-10-CM

## 2019-11-09 DIAGNOSIS — I10 ESSENTIAL HYPERTENSION, BENIGN: ICD-10-CM

## 2019-11-09 DIAGNOSIS — R57.9 SHOCK: ICD-10-CM

## 2019-11-09 DIAGNOSIS — R53.1 WEAKNESS: ICD-10-CM

## 2019-11-09 DIAGNOSIS — R19.7 DIARRHEA OF PRESUMED INFECTIOUS ORIGIN: ICD-10-CM

## 2019-11-09 PROBLEM — F32.9 MAJOR DEPRESSIVE DISORDER: Status: ACTIVE | Noted: 2019-11-09

## 2019-11-09 PROBLEM — N17.9 AKI (ACUTE KIDNEY INJURY): Status: ACTIVE | Noted: 2019-11-09

## 2019-11-09 PROBLEM — D72.829 LEUKOCYTOSIS: Status: ACTIVE | Noted: 2019-11-09

## 2019-11-09 PROBLEM — F11.20 METHADONE DEPENDENCE: Status: ACTIVE | Noted: 2019-11-09

## 2019-11-09 PROBLEM — E87.1 HYPONATREMIA: Status: ACTIVE | Noted: 2019-11-09

## 2019-11-09 LAB
ALBUMIN SERPL BCP-MCNC: 3.8 G/DL (ref 3.5–5.2)
ALP SERPL-CCNC: 265 U/L (ref 55–135)
ALT SERPL W/O P-5'-P-CCNC: 122 U/L (ref 10–44)
AMORPH CRY URNS QL MICRO: ABNORMAL
AMPHET+METHAMPHET UR QL: NEGATIVE
ANION GAP SERPL CALC-SCNC: 17 MMOL/L (ref 8–16)
ANION GAP SERPL CALC-SCNC: 9 MMOL/L (ref 8–16)
APTT BLDCRRT: 29.4 SEC (ref 21–32)
AST SERPL-CCNC: 140 U/L (ref 10–40)
BACTERIA #/AREA URNS HPF: ABNORMAL /HPF
BARBITURATES UR QL SCN>200 NG/ML: NEGATIVE
BASOPHILS # BLD AUTO: 0.01 K/UL (ref 0–0.2)
BASOPHILS NFR BLD: 0 % (ref 0–1.9)
BENZODIAZ UR QL SCN>200 NG/ML: NEGATIVE
BILIRUB SERPL-MCNC: 0.6 MG/DL (ref 0.1–1)
BILIRUB UR QL STRIP: NEGATIVE
BUN SERPL-MCNC: 38 MG/DL (ref 8–23)
BUN SERPL-MCNC: 59 MG/DL (ref 8–23)
BZE UR QL SCN: NEGATIVE
CALCIUM SERPL-MCNC: 7.9 MG/DL (ref 8.7–10.5)
CALCIUM SERPL-MCNC: 9.5 MG/DL (ref 8.7–10.5)
CANNABINOIDS UR QL SCN: NEGATIVE
CHLORIDE SERPL-SCNC: 108 MMOL/L (ref 95–110)
CHLORIDE SERPL-SCNC: 97 MMOL/L (ref 95–110)
CLARITY UR: CLEAR
CO2 SERPL-SCNC: 21 MMOL/L (ref 23–29)
CO2 SERPL-SCNC: 22 MMOL/L (ref 23–29)
COLOR UR: ABNORMAL
CREAT SERPL-MCNC: 1.7 MG/DL (ref 0.5–1.4)
CREAT SERPL-MCNC: 3.9 MG/DL (ref 0.5–1.4)
CREAT UR-MCNC: 121.3 MG/DL (ref 15–325)
DIFFERENTIAL METHOD: ABNORMAL
EOSINOPHIL # BLD AUTO: 0 K/UL (ref 0–0.5)
EOSINOPHIL NFR BLD: 0 % (ref 0–8)
ERYTHROCYTE [DISTWIDTH] IN BLOOD BY AUTOMATED COUNT: 13.2 % (ref 11.5–14.5)
EST. GFR  (AFRICAN AMERICAN): 14 ML/MIN/1.73 M^2
EST. GFR  (AFRICAN AMERICAN): 37 ML/MIN/1.73 M^2
EST. GFR  (NON AFRICAN AMERICAN): 12 ML/MIN/1.73 M^2
EST. GFR  (NON AFRICAN AMERICAN): 32 ML/MIN/1.73 M^2
GLUCOSE SERPL-MCNC: 124 MG/DL (ref 70–110)
GLUCOSE SERPL-MCNC: 143 MG/DL (ref 70–110)
GLUCOSE UR QL STRIP: NEGATIVE
HCT VFR BLD AUTO: 46.1 % (ref 37–48.5)
HGB BLD-MCNC: 15.7 G/DL (ref 12–16)
HGB UR QL STRIP: NEGATIVE
HYALINE CASTS #/AREA URNS LPF: 50 /LPF
INFLUENZA A, MOLECULAR: NEGATIVE
INFLUENZA B, MOLECULAR: NEGATIVE
INR PPP: 1.1 (ref 0.8–1.2)
KETONES UR QL STRIP: ABNORMAL
LACTATE SERPL-SCNC: 1.1 MMOL/L (ref 0.5–2.2)
LACTATE SERPL-SCNC: 2.6 MMOL/L (ref 0.5–2.2)
LEUKOCYTE ESTERASE UR QL STRIP: NEGATIVE
LIPASE SERPL-CCNC: 38 U/L (ref 4–60)
LYMPHOCYTES # BLD AUTO: 1.7 K/UL (ref 1–4.8)
LYMPHOCYTES NFR BLD: 8.2 % (ref 18–48)
MAGNESIUM SERPL-MCNC: 2.4 MG/DL (ref 1.6–2.6)
MCH RBC QN AUTO: 29.5 PG (ref 27–31)
MCHC RBC AUTO-ENTMCNC: 34.1 G/DL (ref 32–36)
MCV RBC AUTO: 87 FL (ref 82–98)
METHADONE UR QL SCN>300 NG/ML: NORMAL
MICROSCOPIC COMMENT: ABNORMAL
MONOCYTES # BLD AUTO: 1.2 K/UL (ref 0.3–1)
MONOCYTES NFR BLD: 5.8 % (ref 4–15)
NEUTROPHILS # BLD AUTO: 17.4 K/UL (ref 1.8–7.7)
NEUTROPHILS NFR BLD: 86 % (ref 38–73)
NITRITE UR QL STRIP: NEGATIVE
OPIATES UR QL SCN: NEGATIVE
PCP UR QL SCN>25 NG/ML: NEGATIVE
PH UR STRIP: 5 [PH] (ref 5–8)
PLATELET # BLD AUTO: 293 K/UL (ref 150–350)
PMV BLD AUTO: 9.8 FL (ref 9.2–12.9)
POTASSIUM SERPL-SCNC: 4 MMOL/L (ref 3.5–5.1)
POTASSIUM SERPL-SCNC: 4.7 MMOL/L (ref 3.5–5.1)
PROT SERPL-MCNC: 7.9 G/DL (ref 6–8.4)
PROT UR QL STRIP: ABNORMAL
PROTHROMBIN TIME: 11.2 SEC (ref 9–12.5)
RBC # BLD AUTO: 5.33 M/UL (ref 4–5.4)
RBC #/AREA URNS HPF: 1 /HPF (ref 0–4)
SODIUM SERPL-SCNC: 135 MMOL/L (ref 136–145)
SODIUM SERPL-SCNC: 139 MMOL/L (ref 136–145)
SP GR UR STRIP: >=1.03 (ref 1–1.03)
SPECIMEN SOURCE: NORMAL
TOXICOLOGY INFORMATION: NORMAL
TROPONIN I SERPL DL<=0.01 NG/ML-MCNC: 0.09 NG/ML (ref 0–0.03)
TROPONIN I SERPL DL<=0.01 NG/ML-MCNC: 0.11 NG/ML (ref 0–0.03)
TSH SERPL DL<=0.005 MIU/L-ACNC: 0.71 UIU/ML (ref 0.4–4)
URN SPEC COLLECT METH UR: ABNORMAL
UROBILINOGEN UR STRIP-ACNC: NEGATIVE EU/DL
WBC # BLD AUTO: 20.29 K/UL (ref 3.9–12.7)
WBC #/AREA URNS HPF: 2 /HPF (ref 0–5)

## 2019-11-09 PROCEDURE — 96375 TX/PRO/DX INJ NEW DRUG ADDON: CPT

## 2019-11-09 PROCEDURE — 83690 ASSAY OF LIPASE: CPT

## 2019-11-09 PROCEDURE — 83735 ASSAY OF MAGNESIUM: CPT

## 2019-11-09 PROCEDURE — 83605 ASSAY OF LACTIC ACID: CPT | Mod: 91

## 2019-11-09 PROCEDURE — 20000000 HC ICU ROOM

## 2019-11-09 PROCEDURE — 25000003 PHARM REV CODE 250: Performed by: HOSPITALIST

## 2019-11-09 PROCEDURE — 87502 INFLUENZA DNA AMP PROBE: CPT

## 2019-11-09 PROCEDURE — 93010 EKG 12-LEAD: ICD-10-PCS | Mod: ,,, | Performed by: INTERNAL MEDICINE

## 2019-11-09 PROCEDURE — 84484 ASSAY OF TROPONIN QUANT: CPT | Mod: 91

## 2019-11-09 PROCEDURE — 63600175 PHARM REV CODE 636 W HCPCS: Performed by: EMERGENCY MEDICINE

## 2019-11-09 PROCEDURE — 85610 PROTHROMBIN TIME: CPT

## 2019-11-09 PROCEDURE — 84484 ASSAY OF TROPONIN QUANT: CPT

## 2019-11-09 PROCEDURE — 25000003 PHARM REV CODE 250: Performed by: EMERGENCY MEDICINE

## 2019-11-09 PROCEDURE — 83605 ASSAY OF LACTIC ACID: CPT

## 2019-11-09 PROCEDURE — 81000 URINALYSIS NONAUTO W/SCOPE: CPT | Mod: 59

## 2019-11-09 PROCEDURE — 36556 INSERT NON-TUNNEL CV CATH: CPT | Performed by: ANESTHESIOLOGY

## 2019-11-09 PROCEDURE — 99291 CRITICAL CARE FIRST HOUR: CPT | Mod: 25

## 2019-11-09 PROCEDURE — 84443 ASSAY THYROID STIM HORMONE: CPT

## 2019-11-09 PROCEDURE — 96374 THER/PROPH/DIAG INJ IV PUSH: CPT | Mod: 59

## 2019-11-09 PROCEDURE — 85730 THROMBOPLASTIN TIME PARTIAL: CPT

## 2019-11-09 PROCEDURE — 93005 ELECTROCARDIOGRAM TRACING: CPT

## 2019-11-09 PROCEDURE — 96361 HYDRATE IV INFUSION ADD-ON: CPT

## 2019-11-09 PROCEDURE — 80048 BASIC METABOLIC PNL TOTAL CA: CPT

## 2019-11-09 PROCEDURE — S0030 INJECTION, METRONIDAZOLE: HCPCS | Performed by: HOSPITALIST

## 2019-11-09 PROCEDURE — 80307 DRUG TEST PRSMV CHEM ANLYZR: CPT

## 2019-11-09 PROCEDURE — 63600175 PHARM REV CODE 636 W HCPCS: Performed by: HOSPITALIST

## 2019-11-09 PROCEDURE — 85025 COMPLETE CBC W/AUTO DIFF WBC: CPT

## 2019-11-09 PROCEDURE — 96365 THER/PROPH/DIAG IV INF INIT: CPT

## 2019-11-09 PROCEDURE — 93010 ELECTROCARDIOGRAM REPORT: CPT | Mod: ,,, | Performed by: INTERNAL MEDICINE

## 2019-11-09 PROCEDURE — 87040 BLOOD CULTURE FOR BACTERIA: CPT | Mod: 59

## 2019-11-09 PROCEDURE — 80053 COMPREHEN METABOLIC PANEL: CPT

## 2019-11-09 RX ORDER — MORPHINE SULFATE 4 MG/ML
4 INJECTION, SOLUTION INTRAMUSCULAR; INTRAVENOUS EVERY 4 HOURS PRN
Status: DISCONTINUED | OUTPATIENT
Start: 2019-11-09 | End: 2019-11-12 | Stop reason: HOSPADM

## 2019-11-09 RX ORDER — ACETAMINOPHEN 325 MG/1
650 TABLET ORAL EVERY 4 HOURS PRN
Status: DISCONTINUED | OUTPATIENT
Start: 2019-11-09 | End: 2019-11-10

## 2019-11-09 RX ORDER — NOREPINEPHRINE BITARTRATE/D5W 16MG/250ML
PLASTIC BAG, INJECTION (ML) INTRAVENOUS
Status: DISPENSED
Start: 2019-11-09 | End: 2019-11-10

## 2019-11-09 RX ORDER — ACETAMINOPHEN 325 MG/1
650 TABLET ORAL
Status: DISCONTINUED | OUTPATIENT
Start: 2019-11-09 | End: 2019-11-10

## 2019-11-09 RX ORDER — OSELTAMIVIR PHOSPHATE 75 MG/1
75 CAPSULE ORAL
Status: DISCONTINUED | OUTPATIENT
Start: 2019-11-09 | End: 2019-11-09

## 2019-11-09 RX ORDER — ESCITALOPRAM OXALATE 10 MG/1
10 TABLET ORAL DAILY
Status: DISCONTINUED | OUTPATIENT
Start: 2019-11-10 | End: 2019-11-12 | Stop reason: HOSPADM

## 2019-11-09 RX ORDER — METHADONE HYDROCHLORIDE 10 MG/1
90 TABLET ORAL DAILY
Status: DISCONTINUED | OUTPATIENT
Start: 2019-11-10 | End: 2019-11-12 | Stop reason: HOSPADM

## 2019-11-09 RX ORDER — METRONIDAZOLE 500 MG/100ML
500 INJECTION, SOLUTION INTRAVENOUS
Status: DISCONTINUED | OUTPATIENT
Start: 2019-11-09 | End: 2019-11-12

## 2019-11-09 RX ORDER — SODIUM CHLORIDE 0.9 % (FLUSH) 0.9 %
10 SYRINGE (ML) INJECTION
Status: DISCONTINUED | OUTPATIENT
Start: 2019-11-09 | End: 2019-11-12 | Stop reason: HOSPADM

## 2019-11-09 RX ORDER — ONDANSETRON 2 MG/ML
4 INJECTION INTRAMUSCULAR; INTRAVENOUS EVERY 8 HOURS PRN
Status: DISCONTINUED | OUTPATIENT
Start: 2019-11-09 | End: 2019-11-12 | Stop reason: HOSPADM

## 2019-11-09 RX ORDER — NOREPINEPHRINE BITARTRATE/D5W 16MG/250ML
0.05 PLASTIC BAG, INJECTION (ML) INTRAVENOUS CONTINUOUS
Status: DISCONTINUED | OUTPATIENT
Start: 2019-11-09 | End: 2019-11-10

## 2019-11-09 RX ORDER — SODIUM CHLORIDE, SODIUM LACTATE, POTASSIUM CHLORIDE, CALCIUM CHLORIDE 600; 310; 30; 20 MG/100ML; MG/100ML; MG/100ML; MG/100ML
INJECTION, SOLUTION INTRAVENOUS CONTINUOUS
Status: DISCONTINUED | OUTPATIENT
Start: 2019-11-09 | End: 2019-11-11

## 2019-11-09 RX ORDER — IPRATROPIUM BROMIDE AND ALBUTEROL SULFATE 2.5; .5 MG/3ML; MG/3ML
3 SOLUTION RESPIRATORY (INHALATION) EVERY 4 HOURS PRN
Status: DISCONTINUED | OUTPATIENT
Start: 2019-11-09 | End: 2019-11-12 | Stop reason: HOSPADM

## 2019-11-09 RX ADMIN — DOXYCYCLINE 100 MG: 100 INJECTION, POWDER, LYOPHILIZED, FOR SOLUTION INTRAVENOUS at 12:11

## 2019-11-09 RX ADMIN — CEFTRIAXONE 2 G: 2 INJECTION, SOLUTION INTRAVENOUS at 11:11

## 2019-11-09 RX ADMIN — SODIUM CHLORIDE 1000 ML: 0.9 INJECTION, SOLUTION INTRAVENOUS at 10:11

## 2019-11-09 RX ADMIN — SODIUM CHLORIDE 1000 ML: 0.9 INJECTION, SOLUTION INTRAVENOUS at 11:11

## 2019-11-09 RX ADMIN — ACETAMINOPHEN 650 MG: 325 TABLET ORAL at 06:11

## 2019-11-09 RX ADMIN — METRONIDAZOLE 500 MG: 500 INJECTION, SOLUTION INTRAVENOUS at 04:11

## 2019-11-09 RX ADMIN — SODIUM CHLORIDE, SODIUM LACTATE, POTASSIUM CHLORIDE, AND CALCIUM CHLORIDE: .6; .31; .03; .02 INJECTION, SOLUTION INTRAVENOUS at 03:11

## 2019-11-09 RX ADMIN — MORPHINE SULFATE 4 MG: 4 INJECTION INTRAVENOUS at 07:11

## 2019-11-09 RX ADMIN — SODIUM CHLORIDE 1000 ML: 0.9 INJECTION, SOLUTION INTRAVENOUS at 01:11

## 2019-11-09 RX ADMIN — Medication 0.05 MCG/KG/MIN: at 12:11

## 2019-11-09 NOTE — ED NOTES
Placed patient in trendelenburg with bear hugger. Worrell draining to gravity of dark yellow urine. Rectal temp 98.1

## 2019-11-09 NOTE — ED NOTES
Anesthesia at bedside to insert central line. Consent signed and verified. Bear hugger stopped at present time

## 2019-11-09 NOTE — H&P
[facility] Medicine  History & Physical    Patient Name: Yu Meza  MRN: [id number]  Admission Date: 11/9/2019  Attending Physician: Gatito Vyas MD   Primary Care Provider: Keisha Silva NP         Patient information was obtained from patient, spouse/SO, past medical records and ER records.     Subjective:     Principal Problem:Shock    Chief Complaint:   Chief Complaint   Patient presents with    N/V/D     Reports N/V/D since yesterday and  reports occasionally seems to be delusional.         HPI: Ms. Meza is a 60 yo female with HTN and methadone dependence that presented to [facility] ED for evaluation of nausea, vomiting, and diarrhea over the last day. She and her  report her having a few episodes of emesis and 4 large, loose bowel movements over the last day. She has some nausea still and reports some dizziness. She says that she has been urinating normally. She denies any recent sick contacts and her  is not having any of the same symptoms. She denies any abdominal pain, dysuria, fever, or cough. She reports mild SOB currently. Her blood pressure was in the 70s systolic upon arrival to the ED and did not respond sufficiently to IV fluids, so she was started on norepinephrine.     Past Medical History:   Diagnosis Date    Hypertension     Methadone dependence        Past Surgical History:   Procedure Laterality Date    CHOLECYSTECTOMY  5/18/14     Laparoscopic Cholecystectomy 5/18/14 [place]    MASS EXCISION Right 1998    Thigh fatty cyst       Review of patient's allergies indicates:  No Known Allergies    No current facility-administered medications on file prior to encounter.      Current Outpatient Medications on File Prior to Encounter   Medication Sig    diclofenac (VOLTAREN) 75 MG EC tablet Take 1 tablet (75 mg total) by mouth 2 (two) times daily as needed.    escitalopram oxalate (LEXAPRO) 10 MG tablet Take 10 mg by mouth  once daily.    estradiol 10 mcg Tab Place vaginally.    lisinopril 10 MG tablet Take 10 mg by mouth once daily.    methadone (DOLOPHINE) 5 MG tablet Take 90 mg by mouth once daily.     methylPREDNISolone (MEDROL DOSEPACK) 4 mg tablet Take as directed     Family History     Problem Relation (Age of Onset)    Cancer Mother        Tobacco Use    Smoking status: Current Every Day Smoker     Packs/day: 0.25    Smokeless tobacco: Never Used   Substance and Sexual Activity    Alcohol use: No    Drug use: No     Comment: currently on metadone    Sexual activity: Yes     Partners: Male     Review of Systems   Constitutional: Positive for appetite change. Negative for diaphoresis and fever.   HENT: Negative for congestion, nosebleeds, sinus pressure and sore throat.    Eyes: Negative for pain and itching.   Respiratory: Positive for shortness of breath. Negative for cough and wheezing.    Cardiovascular: Negative for chest pain, palpitations and leg swelling.   Gastrointestinal: Positive for diarrhea, nausea and vomiting. Negative for abdominal pain and blood in stool.   Endocrine: Negative for cold intolerance, polydipsia and polyphagia.   Genitourinary: Negative for difficulty urinating, dysuria, frequency and hematuria.   Musculoskeletal: Negative for arthralgias, back pain and myalgias.   Skin: Negative for pallor and rash.   Allergic/Immunologic: Negative for environmental allergies and food allergies.   Neurological: Positive for dizziness and light-headedness. Negative for syncope and headaches.   Hematological: Does not bruise/bleed easily.   Psychiatric/Behavioral: Positive for confusion. Negative for agitation. The patient is not nervous/anxious.      Objective:     Vital Signs (Most Recent):  Temp: 97.4 °F (36.3 °C) (11/09/19 1530)  Pulse: 106 (11/09/19 1615)  Resp: 20 (11/09/19 1615)  BP: 106/60 (11/09/19 1615)  SpO2: (!) 93 % (11/09/19 1615) Vital Signs (24h Range):  Temp:  [96.3 °F (35.7 °C)-98.1 °F  (36.7 °C)] 97.4 °F (36.3 °C)  Pulse:  [] 106  Resp:  [18-34] 20  SpO2:  [93 %-100 %] 93 %  BP: ()/(36-95) 106/60     Weight: 67.7 kg (149 lb 4 oz)  Body mass index is 26.44 kg/m².    Physical Exam   Constitutional: She is oriented to person, place, and time. She appears well-developed and well-nourished. She is cooperative. No distress.   HENT:   Head: Normocephalic and atraumatic.   Right Ear: External ear normal.   Left Ear: External ear normal.   Nose: No mucosal edema or sinus tenderness.   Mouth/Throat: Mucous membranes are dry. She has dentures. Abnormal dentition. No oropharyngeal exudate.   Eyes: Pupils are equal, round, and reactive to light. Conjunctivae and EOM are normal. No scleral icterus.   Neck: Phonation normal. Neck supple. No JVD present. No muscular tenderness present. Carotid bruit is not present. No tracheal deviation present.   Cardiovascular: Normal rate, regular rhythm, S1 normal and S2 normal.   Murmur heard.  Pulses:       Radial pulses are 2+ on the right side, and 2+ on the left side.   Pulmonary/Chest: Effort normal and breath sounds normal. No respiratory distress. She has no wheezes. She has no rales. She exhibits no tenderness and no crepitus.   Abdominal: Soft. Bowel sounds are normal. She exhibits no distension. There is no tenderness. There is no rebound and no guarding. No hernia.   Musculoskeletal: She exhibits no edema or tenderness.   Lymphadenopathy:        Right cervical: No superficial cervical adenopathy present.       Left cervical: No superficial cervical adenopathy present.        Right: No supraclavicular adenopathy present.        Left: No supraclavicular adenopathy present.   Neurological: She is alert and oriented to person, place, and time. She displays no tremor. She displays no seizure activity.   Skin: Skin is warm and dry. No rash noted. No cyanosis or erythema. Nails show no clubbing.   Nursing note and vitals reviewed.        CRANIAL NERVES     CN  III, IV, VI   Pupils are equal, round, and reactive to light.  Extraocular motions are normal.        Significant Labs:   CBC:   Recent Labs   Lab 11/09/19  1053   WBC 20.29*   HGB 15.7   HCT 46.1        CMP:   Recent Labs   Lab 11/09/19  1053   *   K 4.7   CL 97   CO2 21*   *   BUN 59*   CREATININE 3.9*   CALCIUM 9.5   PROT 7.9   ALBUMIN 3.8   BILITOT 0.6   ALKPHOS 265*   *   *   ANIONGAP 17*   EGFRNONAA 12*     Coagulation:   Recent Labs   Lab 11/09/19  1053   INR 1.1   APTT 29.4     Lactic Acid:   Recent Labs   Lab 11/09/19  1053   LACTATE 2.6*     Lipase:   Recent Labs   Lab 11/09/19  1053   LIPASE 38     Magnesium:   Recent Labs   Lab 11/09/19  1053   MG 2.4     Troponin:   Recent Labs   Lab 11/09/19  1053   TROPONINI 0.091*     TSH:   Recent Labs   Lab 11/09/19  1053   TSH 0.708     Urine Studies:   Recent Labs   Lab 11/09/19  1112   COLORU [place]*   APPEARANCEUA Clear   PHUR 5.0   SPECGRAV >=1.030*   PROTEINUA 1+*   GLUCUA Negative   KETONESU 1+*   BILIRUBINUA Negative   OCCULTUA Negative   NITRITE Negative   UROBILINOGEN Negative   LEUKOCYTESUR Negative   RBCUA 1   WBCUA 2   BACTERIA Rare   HYALINECASTS 50*       Significant Imaging: CXR: I have reviewed all pertinent results/findings within the past 24 hours and my personal findings are:  Diffuse insterstial markings that are similar to prior CXR    Assessment/Plan:     * Shock  Lactic acidosis  Leukocytosis  Diarrhea/Nausea/Vomiting  Unsure of the etiology.   Possibly due to hypovolemia given the vomiting and diarrhea, but did not sound that profuse.   Possibly septic with the diarrhea  Given 3 liters of saline in the ED and BP remained low, so started on norepinephrine.   Continue norepinephrine to maintain MAP > 65.   Continue LR at 125 mL/hr.   Repeat lactate.   Given ceftriaxone and doxycycline in the ED. Will continue ceftriaxone and start on metronidazole.     Acute renal failure with tubular  necrosis  Hyponatremia  Given 3 liters of saline in the ED.   Has good UOP in the ED.   Continue to monitor renal function.   Continue LR at 125 mL/hr.     Abnormal liver enzymes  A little higher than prior.   RUQ US pending.   Trend.       Major depressive disorder  Continue lexapro.       Essential hypertension, benign  Hold home lisinopril 10 mg daily with the low blood pressure and YADIRA.       Methadone dependence  Continue home methadone 90 mg daily.         VTE Risk Mitigation (From admission, onward)         Ordered     IP VTE LOW RISK PATIENT  Once      11/09/19 1456     Place DRAKE hose  Until discontinued      11/09/19 1456     Place sequential compression device  Until discontinued      11/09/19 1456              Critical care time spent on the evaluation and treatment of severe organ dysfunction, review of pertinent labs and imaging studies, discussions with consulting providers and discussions with patient/family: 60 minutes.     Gatito Vyas MD  Department of Hospital Medicine   [facility]

## 2019-11-09 NOTE — ANESTHESIA PROCEDURE NOTES
Central Line    Diagnosis: Vascular Access   Patient location during procedure: ED  Procedure start time: 11/9/2019 1:34 AM  Timeout: 11/9/2019 1:32 AM  Procedure end time: 11/9/2019 1:48 AM    Staffing  Authorizing Provider: Rosa Blackburn MD  Performing Provider: Rosa Blackburn MD    Staffing  Anesthesiologist: Stephenie Mai MD  Resident/CRNA: Rosa Blackburn MD  Performed: resident/CRNA   Anesthesiologist was present at the time of the procedure.  Preanesthetic Checklist  Completed: patient identified, site marked, surgical consent, pre-op evaluation, timeout performed, IV checked, risks and benefits discussed, monitors and equipment checked and anesthesia consent given  Indication   Indication: vascular access, med administration     Anesthesia   local infiltration    Central Line   Skin Prep: skin prepped with chlorhexidine (without alcohol), skin prep agent completely dried prior to procedure  maximum sterile barriers used during central venous catheter insertion  hand hygiene performed prior to central venous catheter insertion  Location: right, internal jugular.   Catheter type: triple lumen  Catheter Size: 7 Fr  Inserted Catheter Length: 13 cm  Ultrasound: vascular probe with ultrasound  Vessel Caliber: patent  Vascular Doppler:  not done  Needle advanced into vessel with real time Ultrasound guidance.  Guidewire confirmed in vessel.   Manometry: Venous cannualation confirmed by visual estimation of blood vessel pressure using manometry.  Insertion Attempts: 1   Securement:line sutured, chlorhexidine patch, sterile dressing applied and blood return through all ports    Post-Procedure   X-Ray: no pneumothorax on x-ray, placement verified by x-ray and successful placement  Adverse Events:none    Guidewire Guidewire removed intact.

## 2019-11-09 NOTE — ASSESSMENT & PLAN NOTE
Hyponatremia  Given 3 liters of saline in the ED.   Has good UOP in the ED.   Continue to monitor renal function.   Continue LR at 125 mL/hr.

## 2019-11-09 NOTE — ED NOTES
Attempted to take patient out of trendelenburg position and BP dropped and was unmeasurable with automatic cuff. Patient remains AAOx4, verbal and respirations even and unlabored with equal chest rise and fall. MD at bedside to reassess. Placed patietn back in reverse trendelenburg with 3L of fluids. Notified MD

## 2019-11-09 NOTE — HPI
Yu Meza is a 59 y.o. white woman with hypertension, depression, methadone dependence, history of laparoscopic cholecystectomy. She lives in [place], [place]. She is . Her primary care nurse practitioner is Keisha Silva.   She presented to [facility] Emergency Department on 11/9/19 with nausea, vomiting, and diarrhea for the past day. She and her  reported that she had a few episodes of emesis and 4 large, loose bowel movements. She had dizziness. Her  did not have similar symptoms. Her systolic blood pressure was in the 70s upon arrival to the emergency department and did not respond sufficiently to IV fluids, so she was started on norepinephrine. She was admitted to [facility].

## 2019-11-09 NOTE — ED PROVIDER NOTES
Encounter Date: 11/9/2019    SCRIBE #1 NOTE: I, Michelle Ahumada, am scribing for, and in the presence of,  Dr. Tam. I have scribed the entire note.       History     Chief Complaint   Patient presents with    N/V/D     Reports N/V/D since yesterday and  reports occasionally seems to be delusional.      Pt is a 58 yo female w/h/o HTN, who presents for N/V/D since last night with altered mental status. Patient reports that last night she had 3 episodes of diarrhea and one episode of vomiting this morning.  notes the diarrhea was a brownish color and the vomitus today was a dark reddish color. Patient only drank tea this morning for breakfast and denies have eaten dinner last night due to her symptoms. Patient notes her nausea is resolved in the ED but does complain of generalized aching abdominal pain. Patient also complains of ZARAGOZA, nonproductive cough, generalized body aches, left sided neck pain, and lightheadedness that worsens with movements.  notes the patient has had occurrences where she is oriented to self but not time or place. He notes she goes back to her baseline after rest. Patient denies, dysuria, hematuria, urgency, sore throat, fever, or any other complaints at this time.  notes she was exposed to another individual with the flu on Thursday and she did not receive her flu vaccine this year. Patient denies any recent travels. No ETOh consumption or recreational drug use.    The history is provided by the patient and the spouse.     Review of patient's allergies indicates:  No Known Allergies  Past Medical History:   Diagnosis Date    Hypertension     Methadone dependence      Past Surgical History:   Procedure Laterality Date    CHOLECYSTECTOMY  5/18/14     Laparoscopic Cholecystectomy 5/18/14 [facility]    MASS EXCISION Right 1998    Thigh fatty cyst     No family history on file.  Social History     Tobacco Use    Smoking status: Current Every Day Smoker      Packs/day: 0.25    Smokeless tobacco: Never Used   Substance Use Topics    Alcohol use: No    Drug use: No     Review of Systems   Respiratory: Positive for cough and shortness of breath.    Gastrointestinal: Positive for abdominal pain, diarrhea, nausea and vomiting.   Neurological: Positive for light-headedness.   Psychiatric/Behavioral: Positive for confusion.   All other systems reviewed and are negative.      Physical Exam     Initial Vitals   BP Pulse Resp Temp SpO2   11/09/19 1007 11/09/19 1046 11/09/19 1046 11/09/19 1120 11/09/19 1051   (!) 161/95 80 (!) 21 96.3 °F (35.7 °C) 95 %      MAP       --                Physical Exam  Appearance: Moderate acute distress.  HEENT: Normocephalic. Atraumatic. No conjunctival injection. EOMI. PERRL. Oropharynx clear.    Neck: Negative Kernig's sign. Negative Brudzinski's sign. No JVD. No thyromegaly or nodules. No LN. Neck supple.    Chest: Non-tender. Clear to auscultation bilaterally.  Good air movement.  No wheezes.  No rhonchi.  Cardiovascular: Regular rate and rhythm. Heart sounds are distant. No murmurs. No gallops. No rubs. +1 radial/DP/DT pulses bilaterally.  Abdomen: Soft. Not distended. Nontender. No guarding. No rebound. No Masses  Musculoskeletal: Good range of motion all joints. No deformities.    Neurologic: Alert and oriented x 3.  Equal strength in upper and lower extremities bilaterally. Normal sensation. No facial droop. Normal speech. Neg Kernigs and Brudzinski.   Psych:  Appropriate, conversant. Denies SI or HI.  Integumentary: Delayed capillary refill. No rashes seen.  Good turgor.  No abrasions.  No ecchymoses.    ED Course   Critical Care  Date/Time: 11/9/2019 12:17 PM  Performed by: Kelley Tam MD  Authorized by: Kelley Tam MD   Direct patient critical care time: 30 minutes  Total critical care time (exclusive of procedural time) : 30 minutes  Critical care was time spent personally by me on the following activities:  development of treatment plan with patient or surrogate, blood draw for specimens, discussions with consultants, interpretation of cardiac output measurements, evaluation of patient's response to treatment, examination of patient, obtaining history from patient or surrogate, ordering and performing treatments and interventions, ordering and review of laboratory studies, ordering and review of radiographic studies, pulse oximetry and re-evaluation of patient's condition.        Labs Reviewed   CBC W/ AUTO DIFFERENTIAL - Abnormal; Notable for the following components:       Result Value    WBC 20.29 (*)     Gran # (ANC) 17.4 (*)     Mono # 1.2 (*)     Gran% 86.0 (*)     Lymph% 8.2 (*)     All other components within normal limits   COMPREHENSIVE METABOLIC PANEL - Abnormal; Notable for the following components:    Sodium 135 (*)     CO2 21 (*)     Glucose 143 (*)     BUN, Bld 59 (*)     Creatinine 3.9 (*)     Alkaline Phosphatase 265 (*)      (*)      (*)     Anion Gap 17 (*)     eGFR if  14 (*)     eGFR if non  12 (*)     All other components within normal limits   LACTIC ACID, PLASMA - Abnormal; Notable for the following components:    Lactate (Lactic Acid) 2.6 (*)     All other components within normal limits   TROPONIN I - Abnormal; Notable for the following components:    Troponin I 0.091 (*)     All other components within normal limits   URINALYSIS, REFLEX TO URINE CULTURE - Abnormal; Notable for the following components:    Color, UA Orange (*)     Specific Gravity, UA >=1.030 (*)     Protein, UA 1+ (*)     Ketones, UA 1+ (*)     All other components within normal limits    Narrative:     Preferred Collection Type->Urine, Clean Catch   URINALYSIS MICROSCOPIC - Abnormal; Notable for the following components:    Hyaline Casts, UA 50 (*)     All other components within normal limits    Narrative:     Preferred Collection Type->Urine, Clean Catch   INFLUENZA A & B BY  MOLECULAR   CULTURE, BLOOD   CULTURE, BLOOD   MAGNESIUM   PROTIME-INR   APTT   TSH   LIPASE   DRUG SCREEN PANEL, URINE EMERGENCY    Narrative:     Preferred Collection Type->Urine, Clean Catch          X-Rays:   Independently Interpreted Readings:   Other Readings:  Reviewed by myself, read by radiology.    Imaging Results          US Abdomen Limited (Final result)  Result time 11/09/19 13:40:30    Final result by Wild Colon MD (11/09/19 13:40:30)                 Impression:      Gallbladder not identified compatible with reported history of prior surgical removal.    No biliary ductal dilatation.    Mildly dilated pancreatic duct, nonspecific.  Further evaluation with elective/nonemergent MRCP or ERCP can be obtained as warranted.      Electronically signed by: Wild Colon MD  Date:    11/09/2019  Time:    13:40             Narrative:    EXAMINATION:  US ABDOMEN LIMITED    CLINICAL HISTORY:  RUQ - biliary tree please;    TECHNIQUE:  Limited ultrasound of the right upper quadrant of the abdomen (including pancreas, liver, gallbladder, common bile duct, and spleen) was performed.    COMPARISON:  None.    FINDINGS:  Liver: Normal in size, measuring 15.8 cm. Homogeneous echotexture. No focal hepatic lesions.    Gallbladder: Not identified compatible with reported history of prior surgical removal.  Sonographic Daniel sign is negative.    Biliary system: The common duct is not dilated, measuring 4-5 mm.  No intrahepatic ductal dilatation.    Spleen: Normal in size and echotexture, measuring 11 cm.    Miscellaneous: No upper abdominal ascites.  Pancreatic duct measures up to 3-4 mm at the imaged neck and proximal body.  Remainder of the imaged pancreas is within normal limits.  Main portal vein appears patent.                                X-Ray Chest AP Portable (Final result)  Result time 11/09/19 13:13:17    Final result by Wild Colon MD (11/09/19 13:13:17)                 Impression:      Bilateral  diffuse nonspecific interstitial coarsening which can be seen with pulmonary edema or interstitial pneumonia.  No focal consolidation.  Follow-up as warranted.    This report was flagged in Epic as abnormal.      Electronically signed by: Wild Colon MD  Date:    11/09/2019  Time:    13:13             Narrative:    EXAMINATION:  XR CHEST AP PORTABLE    CLINICAL HISTORY:  shortness of breath;    TECHNIQUE:  Single frontal view of the chest was performed.    COMPARISON:  Chest radiograph 07/30/2015    FINDINGS:  Monitoring leads overlie the chest.  Cardiomediastinal silhouette is midline and within normal limits.  The lungs are symmetrically well expanded with bilateral diffuse nonspecific interstitial coarsening.  No focal consolidation, pleural effusion or pneumothorax.  Pulmonary vasculature and hilar regions are within normal limits.  Osseous structures show mild degenerative change without acute process seen.  PA and lateral views can be obtained.                              Medical Decision Making:   Clinical Tests:   Lab Tests: Ordered and Reviewed  Radiological Study: Ordered and Reviewed  Medical Tests: Ordered and Reviewed  60 yo female presents for abdominal pain, n/v/d. Exam shows faint pulses, initial triage BP incorrect - re-checked several times once in ER room  With SBP 70s. Otherwise non-focal exam. Labs significant for leukocytosis with lactic acidosis, ARF, and transaminitis. RUQ US ordered to bedside given pt's instability. Fluid resuscitated without improvement. Began norepi and asked anesthesia to place CVL. Flagyl and rocephin for likely sepsis of abdominal origin. Admitted to ICU.                                 Clinical Impression:     1. Weakness    2. Shock    3. S/P PICC central line placement      Disposition:   Disposition: Admitted  Condition: Serious      I, Dr. Kelley Tam, personally performed the services described in this documentation. All medical record entries made by the  cy were at my direction and in my presence.  I have reviewed the chart and agree that the record reflects my personal performance and is accurate and complete. Kelley Tam MD.  11:09 PM 11/11/2019               Kelley Tam MD  11/11/19 [id number]

## 2019-11-09 NOTE — ED NOTES
Reported of to ICU, care assumed. Informed nurse that when assessing ortega that blood was found around ortega catheter. Urine is present in line and output is WNL

## 2019-11-09 NOTE — ASSESSMENT & PLAN NOTE
Lactic acidosis  Leukocytosis  Diarrhea/Nausea/Vomiting  Unsure of the etiology.   Possibly due to hypovolemia given the vomiting and diarrhea, but did not sound that profuse.   Possibly septic with the diarrhea  Given 3 liters of saline in the ED and BP remained low, so started on norepinephrine.   Continue norepinephrine to maintain MAP > 65.   Continue LR at 125 mL/hr.   Repeat lactate.   Given ceftriaxone and doxycycline in the ED. Will continue ceftriaxone and start on metronidazole.

## 2019-11-09 NOTE — SUBJECTIVE & OBJECTIVE
Past Medical History:   Diagnosis Date    Hypertension     Methadone dependence        Past Surgical History:   Procedure Laterality Date    CHOLECYSTECTOMY  5/18/14     Laparoscopic Cholecystectomy 5/18/14 [facility]    MASS EXCISION Right 1998    Thigh fatty cyst       Review of patient's allergies indicates:  No Known Allergies    No current facility-administered medications on file prior to encounter.      Current Outpatient Medications on File Prior to Encounter   Medication Sig    diclofenac (VOLTAREN) 75 MG EC tablet Take 1 tablet (75 mg total) by mouth 2 (two) times daily as needed.    escitalopram oxalate (LEXAPRO) 10 MG tablet Take 10 mg by mouth once daily.    estradiol 10 mcg Tab Place vaginally.    lisinopril 10 MG tablet Take 10 mg by mouth once daily.    methadone (DOLOPHINE) 5 MG tablet Take 90 mg by mouth once daily.     methylPREDNISolone (MEDROL DOSEPACK) 4 mg tablet Take as directed     Family History     Problem Relation (Age of Onset)    Cancer Mother        Tobacco Use    Smoking status: Current Every Day Smoker     Packs/day: 0.25    Smokeless tobacco: Never Used   Substance and Sexual Activity    Alcohol use: No    Drug use: No     Comment: currently on metadone    Sexual activity: Yes     Partners: Male     Review of Systems   Constitutional: Positive for appetite change. Negative for diaphoresis and fever.   HENT: Negative for congestion, nosebleeds, sinus pressure and sore throat.    Eyes: Negative for pain and itching.   Respiratory: Positive for shortness of breath. Negative for cough and wheezing.    Cardiovascular: Negative for chest pain, palpitations and leg swelling.   Gastrointestinal: Positive for diarrhea, nausea and vomiting. Negative for abdominal pain and blood in stool.   Endocrine: Negative for cold intolerance, polydipsia and polyphagia.   Genitourinary: Negative for difficulty urinating, dysuria, frequency and hematuria.   Musculoskeletal: Negative for  arthralgias, back pain and myalgias.   Skin: Negative for pallor and rash.   Allergic/Immunologic: Negative for environmental allergies and food allergies.   Neurological: Positive for dizziness and light-headedness. Negative for syncope and headaches.   Hematological: Does not bruise/bleed easily.   Psychiatric/Behavioral: Positive for confusion. Negative for agitation. The patient is not nervous/anxious.      Objective:     Vital Signs (Most Recent):  Temp: 97.4 °F (36.3 °C) (11/09/19 1530)  Pulse: 106 (11/09/19 1615)  Resp: 20 (11/09/19 1615)  BP: 106/60 (11/09/19 1615)  SpO2: (!) 93 % (11/09/19 1615) Vital Signs (24h Range):  Temp:  [96.3 °F (35.7 °C)-98.1 °F (36.7 °C)] 97.4 °F (36.3 °C)  Pulse:  [] 106  Resp:  [18-34] 20  SpO2:  [93 %-100 %] 93 %  BP: ()/(36-95) 106/60     Weight: 67.7 kg (149 lb 4 oz)  Body mass index is 26.44 kg/m².    Physical Exam   Constitutional: She is oriented to person, place, and time. She appears well-developed and well-nourished. She is cooperative. No distress.   HENT:   Head: Normocephalic and atraumatic.   Right Ear: External ear normal.   Left Ear: External ear normal.   Nose: No mucosal edema or sinus tenderness.   Mouth/Throat: Mucous membranes are dry. She has dentures. Abnormal dentition. No oropharyngeal exudate.   Eyes: Pupils are equal, round, and reactive to light. Conjunctivae and EOM are normal. No scleral icterus.   Neck: Phonation normal. Neck supple. No JVD present. No muscular tenderness present. Carotid bruit is not present. No tracheal deviation present.   Cardiovascular: Normal rate, regular rhythm, S1 normal and S2 normal.   Murmur heard.  Pulses:       Radial pulses are 2+ on the right side, and 2+ on the left side.   Pulmonary/Chest: Effort normal and breath sounds normal. No respiratory distress. She has no wheezes. She has no rales. She exhibits no tenderness and no crepitus.   Abdominal: Soft. Bowel sounds are normal. She exhibits no  distension. There is no tenderness. There is no rebound and no guarding. No hernia.   Musculoskeletal: She exhibits no edema or tenderness.   Lymphadenopathy:        Right cervical: No superficial cervical adenopathy present.       Left cervical: No superficial cervical adenopathy present.        Right: No supraclavicular adenopathy present.        Left: No supraclavicular adenopathy present.   Neurological: She is alert and oriented to person, place, and time. She displays no tremor. She displays no seizure activity.   Skin: Skin is warm and dry. No rash noted. No cyanosis or erythema. Nails show no clubbing.   Nursing note and vitals reviewed.        CRANIAL NERVES     CN III, IV, VI   Pupils are equal, round, and reactive to light.  Extraocular motions are normal.        Significant Labs:   CBC:   Recent Labs   Lab 11/09/19  1053   WBC 20.29*   HGB 15.7   HCT 46.1        CMP:   Recent Labs   Lab 11/09/19  1053   *   K 4.7   CL 97   CO2 21*   *   BUN 59*   CREATININE 3.9*   CALCIUM 9.5   PROT 7.9   ALBUMIN 3.8   BILITOT 0.6   ALKPHOS 265*   *   *   ANIONGAP 17*   EGFRNONAA 12*     Coagulation:   Recent Labs   Lab 11/09/19  1053   INR 1.1   APTT 29.4     Lactic Acid:   Recent Labs   Lab 11/09/19  1053   LACTATE 2.6*     Lipase:   Recent Labs   Lab 11/09/19  1053   LIPASE 38     Magnesium:   Recent Labs   Lab 11/09/19  1053   MG 2.4     Troponin:   Recent Labs   Lab 11/09/19  1053   TROPONINI 0.091*     TSH:   Recent Labs   Lab 11/09/19  1053   TSH 0.708     Urine Studies:   Recent Labs   Lab 11/09/19  1112   COLORU [place]*   APPEARANCEUA Clear   PHUR 5.0   SPECGRAV >=1.030*   PROTEINUA 1+*   GLUCUA Negative   KETONESU 1+*   BILIRUBINUA Negative   OCCULTUA Negative   NITRITE Negative   UROBILINOGEN Negative   LEUKOCYTESUR Negative   RBCUA 1   WBCUA 2   BACTERIA Rare   HYALINECASTS 50*       Significant Imaging: CXR: I have reviewed all pertinent results/findings within the past 24  hours and my personal findings are:  Diffuse insterstial markings that are similar to prior CXR

## 2019-11-09 NOTE — ED NOTES
Xray at bedside. Patient is awake and alert. Removed patient from Trendelenburg  and will continue to monitor VS

## 2019-11-09 NOTE — ED NOTES
Patient was moved to bed 3. Patient reported that she started yestereday with 3 epsiodes of diarrhea and two episodes today with one massive episode of vomiting.  stated that her orientation changed last night and continued this am.Patient verbal, oriented to place and time. With period episodes of confusion intermittent.  Breathing to room air, respirations even and non labored. Breath sounds clear in all fields. Patient at present time does not have measurable BP with automatic machine. Placed patient in reverse trendelenburg with 1L of fluids. BP measured 77/45. Placed patient on monitor. Charge nurse started IV access to Left hand and is obtaining second line. Md notified of Patient VS and ordered second liter of fluids.

## 2019-11-10 PROBLEM — E83.39 HYPOPHOSPHATEMIA: Status: ACTIVE | Noted: 2019-11-10

## 2019-11-10 PROBLEM — K52.9 ILEITIS: Status: ACTIVE | Noted: 2019-11-10

## 2019-11-10 PROBLEM — E87.6 HYPOKALEMIA: Status: ACTIVE | Noted: 2019-11-10

## 2019-11-10 PROBLEM — R53.1 WEAKNESS: Status: ACTIVE | Noted: 2019-11-10

## 2019-11-10 PROBLEM — E83.42 HYPOMAGNESEMIA: Status: ACTIVE | Noted: 2019-11-10

## 2019-11-10 LAB
ALBUMIN SERPL BCP-MCNC: 3 G/DL (ref 3.5–5.2)
ALP SERPL-CCNC: 164 U/L (ref 55–135)
ALT SERPL W/O P-5'-P-CCNC: 84 U/L (ref 10–44)
ANION GAP SERPL CALC-SCNC: 7 MMOL/L (ref 8–16)
AST SERPL-CCNC: 83 U/L (ref 10–40)
BASOPHILS # BLD AUTO: ABNORMAL K/UL (ref 0–0.2)
BASOPHILS NFR BLD: 0 % (ref 0–1.9)
BILIRUB SERPL-MCNC: 0.5 MG/DL (ref 0.1–1)
BUN SERPL-MCNC: 18 MG/DL (ref 8–23)
CALCIUM SERPL-MCNC: 8.5 MG/DL (ref 8.7–10.5)
CHLORIDE SERPL-SCNC: 106 MMOL/L (ref 95–110)
CO2 SERPL-SCNC: 26 MMOL/L (ref 23–29)
CREAT SERPL-MCNC: 0.7 MG/DL (ref 0.5–1.4)
CRP SERPL-MCNC: 153.2 MG/L (ref 0–3.19)
DIFFERENTIAL METHOD: ABNORMAL
EOSINOPHIL # BLD AUTO: ABNORMAL K/UL (ref 0–0.5)
EOSINOPHIL NFR BLD: 0 % (ref 0–8)
ERYTHROCYTE [DISTWIDTH] IN BLOOD BY AUTOMATED COUNT: 13.2 % (ref 11.5–14.5)
ERYTHROCYTE [SEDIMENTATION RATE] IN BLOOD BY WESTERGREN METHOD: 32 MM/HR (ref 0–20)
EST. GFR  (AFRICAN AMERICAN): >60 ML/MIN/1.73 M^2
EST. GFR  (NON AFRICAN AMERICAN): >60 ML/MIN/1.73 M^2
GLUCOSE SERPL-MCNC: 142 MG/DL (ref 70–110)
HCT VFR BLD AUTO: 37.5 % (ref 37–48.5)
HGB BLD-MCNC: 12.8 G/DL (ref 12–16)
LACTATE SERPL-SCNC: 1.6 MMOL/L (ref 0.5–2.2)
LDH SERPL L TO P-CCNC: 392 U/L (ref 110–260)
LYMPHOCYTES # BLD AUTO: ABNORMAL K/UL (ref 1–4.8)
LYMPHOCYTES NFR BLD: 10 % (ref 18–48)
MAGNESIUM SERPL-MCNC: 1.5 MG/DL (ref 1.6–2.6)
MCH RBC QN AUTO: 28.9 PG (ref 27–31)
MCHC RBC AUTO-ENTMCNC: 34.1 G/DL (ref 32–36)
MCV RBC AUTO: 85 FL (ref 82–98)
MONOCYTES # BLD AUTO: ABNORMAL K/UL (ref 0.3–1)
MONOCYTES NFR BLD: 8 % (ref 4–15)
NEUTROPHILS NFR BLD: 40 % (ref 38–73)
NEUTS BAND NFR BLD MANUAL: 42 %
PHOSPHATE SERPL-MCNC: 1.7 MG/DL (ref 2.7–4.5)
PLATELET # BLD AUTO: 174 K/UL (ref 150–350)
PLATELET BLD QL SMEAR: ABNORMAL
PMV BLD AUTO: 9.4 FL (ref 9.2–12.9)
POTASSIUM SERPL-SCNC: 3.4 MMOL/L (ref 3.5–5.1)
PROT SERPL-MCNC: 6.4 G/DL (ref 6–8.4)
RBC # BLD AUTO: 4.43 M/UL (ref 4–5.4)
SODIUM SERPL-SCNC: 139 MMOL/L (ref 136–145)
TROPONIN I SERPL DL<=0.01 NG/ML-MCNC: 0.04 NG/ML (ref 0–0.03)
WBC # BLD AUTO: 10.92 K/UL (ref 3.9–12.7)
WBC TOXIC VACUOLES BLD QL SMEAR: PRESENT

## 2019-11-10 PROCEDURE — 83735 ASSAY OF MAGNESIUM: CPT

## 2019-11-10 PROCEDURE — 63600175 PHARM REV CODE 636 W HCPCS: Performed by: HOSPITALIST

## 2019-11-10 PROCEDURE — S0030 INJECTION, METRONIDAZOLE: HCPCS | Performed by: HOSPITALIST

## 2019-11-10 PROCEDURE — 85027 COMPLETE CBC AUTOMATED: CPT

## 2019-11-10 PROCEDURE — 99233 SBSQ HOSP IP/OBS HIGH 50: CPT | Mod: ,,, | Performed by: INTERNAL MEDICINE

## 2019-11-10 PROCEDURE — 25000003 PHARM REV CODE 250: Performed by: HOSPITALIST

## 2019-11-10 PROCEDURE — 80053 COMPREHEN METABOLIC PANEL: CPT

## 2019-11-10 PROCEDURE — 83615 LACTATE (LD) (LDH) ENZYME: CPT

## 2019-11-10 PROCEDURE — 84484 ASSAY OF TROPONIN QUANT: CPT

## 2019-11-10 PROCEDURE — 84100 ASSAY OF PHOSPHORUS: CPT

## 2019-11-10 PROCEDURE — 85652 RBC SED RATE AUTOMATED: CPT

## 2019-11-10 PROCEDURE — 86141 C-REACTIVE PROTEIN HS: CPT

## 2019-11-10 PROCEDURE — 99233 PR SUBSEQUENT HOSPITAL CARE,LEVL III: ICD-10-PCS | Mod: ,,, | Performed by: INTERNAL MEDICINE

## 2019-11-10 PROCEDURE — 83605 ASSAY OF LACTIC ACID: CPT

## 2019-11-10 PROCEDURE — 85007 BL SMEAR W/DIFF WBC COUNT: CPT

## 2019-11-10 PROCEDURE — 11000001 HC ACUTE MED/SURG PRIVATE ROOM

## 2019-11-10 PROCEDURE — 87449 NOS EACH ORGANISM AG IA: CPT

## 2019-11-10 RX ORDER — HYDROMORPHONE HYDROCHLORIDE 1 MG/ML
0.5 INJECTION, SOLUTION INTRAMUSCULAR; INTRAVENOUS; SUBCUTANEOUS ONCE
Status: COMPLETED | OUTPATIENT
Start: 2019-11-10 | End: 2019-11-10

## 2019-11-10 RX ORDER — ACETAMINOPHEN 325 MG/1
650 TABLET ORAL EVERY 4 HOURS PRN
Status: DISCONTINUED | OUTPATIENT
Start: 2019-11-10 | End: 2019-11-12 | Stop reason: HOSPADM

## 2019-11-10 RX ORDER — MORPHINE SULFATE 2 MG/ML
2 INJECTION, SOLUTION INTRAMUSCULAR; INTRAVENOUS EVERY 4 HOURS PRN
Status: DISCONTINUED | OUTPATIENT
Start: 2019-11-10 | End: 2019-11-12 | Stop reason: HOSPADM

## 2019-11-10 RX ADMIN — METRONIDAZOLE 500 MG: 500 INJECTION, SOLUTION INTRAVENOUS at 08:11

## 2019-11-10 RX ADMIN — ACETAMINOPHEN 650 MG: 325 TABLET ORAL at 08:11

## 2019-11-10 RX ADMIN — MORPHINE SULFATE 4 MG: 4 INJECTION INTRAVENOUS at 12:11

## 2019-11-10 RX ADMIN — HYDROMORPHONE HYDROCHLORIDE 0.5 MG: 1 INJECTION, SOLUTION INTRAMUSCULAR; INTRAVENOUS; SUBCUTANEOUS at 05:11

## 2019-11-10 RX ADMIN — METRONIDAZOLE 500 MG: 500 INJECTION, SOLUTION INTRAVENOUS at 12:11

## 2019-11-10 RX ADMIN — CEFTRIAXONE 2 G: 2 INJECTION, SOLUTION INTRAVENOUS at 03:11

## 2019-11-10 RX ADMIN — ESCITALOPRAM OXALATE 10 MG: 10 TABLET ORAL at 08:11

## 2019-11-10 RX ADMIN — METHADONE HYDROCHLORIDE 90 MG: 10 TABLET ORAL at 08:11

## 2019-11-10 RX ADMIN — ACETAMINOPHEN 650 MG: 325 TABLET ORAL at 05:11

## 2019-11-10 RX ADMIN — SODIUM CHLORIDE, SODIUM LACTATE, POTASSIUM CHLORIDE, AND CALCIUM CHLORIDE: .6; .31; .03; .02 INJECTION, SOLUTION INTRAVENOUS at 08:11

## 2019-11-10 RX ADMIN — METRONIDAZOLE 500 MG: 500 INJECTION, SOLUTION INTRAVENOUS at 05:11

## 2019-11-10 RX ADMIN — HYDROMORPHONE HYDROCHLORIDE 0.5 MG: 1 INJECTION, SOLUTION INTRAMUSCULAR; INTRAVENOUS; SUBCUTANEOUS at 02:11

## 2019-11-10 RX ADMIN — MORPHINE SULFATE 2 MG: 2 INJECTION, SOLUTION INTRAMUSCULAR; INTRAVENOUS at 08:11

## 2019-11-10 RX ADMIN — SODIUM CHLORIDE, SODIUM LACTATE, POTASSIUM CHLORIDE, AND CALCIUM CHLORIDE: .6; .31; .03; .02 INJECTION, SOLUTION INTRAVENOUS at 10:11

## 2019-11-10 NOTE — PLAN OF CARE
Pt alert, room air, VSS, very restless, reported constant abdominal pain , morphine and hydromorphone were given, pt reported no relief the entire shift , MD ordered stat CT of abdomen,  she had 3  liquid BM during my shift.    Patient continued to call for pain, she is tachycardic, eICU physician called again, another dose of hydromorphone given, MD also ordered bedside examination, Hospitalist on call notified of eICU Dr's order, Pt reassured and was informed of all we are doing and plans.

## 2019-11-10 NOTE — NURSING
Patient arrived to room in bed with ICU nurse. Patient AAOx4 and reports abdominal pain a 4/10. IV clean, dry, and intact and infusing LR @ 125 cc/hr. Tele monitor in placed on patient reading ST. Special contact precautions maintained. Patient oriented to room and call bell usage and informed about VN as a part of healthcare team. Bed alarm on, bed locked and low, side rails up x2, and call bell in reach. No acute distress noted.

## 2019-11-10 NOTE — ASSESSMENT & PLAN NOTE
Infectious versus inflammatory  Stool studies pending.  Follow up  Given abnormal CT findings patient would merit colonoscopy while inpatient.  Offered to patient however she chose to defer.  Will consider prior to discharge

## 2019-11-10 NOTE — NURSING TRANSFER
Nursing Transfer Note      11/10/2019     Transfer To: [id number] From:     Transfer via bed    Transfer with cardiac monitoring    Transported by PERLITA Nogueira    Medicines sent: N/A    Chart send with patient: Yes    Notified: spouse    Patient reassessed at: 11/10/2018, 1530    Upon arrival to floor: cardiac monitor applied, patient oriented to room, call bell in reach and bed in lowest position. RN at bedside. All questions answered.

## 2019-11-10 NOTE — SUBJECTIVE & OBJECTIVE
Interval History: Had abdominal pain through the night and could not get relief despite pain medication. Doing a little better this AM after getting morning methadone. She reports a few episodes of diarrhea overnight, but only 1 episode recorded.     Review of Systems   Constitutional: Negative for chills and fever.   Respiratory: Negative for cough and shortness of breath.    Cardiovascular: Negative for chest pain and palpitations.   Gastrointestinal: Negative for nausea and vomiting.     Objective:     Vital Signs (Most Recent):  Temp: 97.6 °F (36.4 °C) (11/10/19 0703)  Pulse: 105 (11/10/19 1000)  Resp: 16 (11/10/19 1000)  BP: (!) 162/73 (11/10/19 1000)  SpO2: 99 % (11/10/19 1000) Vital Signs (24h Range):  Temp:  [96.3 °F (35.7 °C)-100 °F (37.8 °C)] 97.6 °F (36.4 °C)  Pulse:  [] 105  Resp:  [16-39] 16  SpO2:  [93 %-100 %] 99 %  BP: ()/(36-97) 162/73     Weight: 67.7 kg (149 lb 4 oz)  Body mass index is 26.44 kg/m².    Intake/Output Summary (Last 24 hours) at 11/10/2019 1027  Last data filed at 11/10/2019 0900  Gross per 24 hour   Intake 2110 ml   Output 2985 ml   Net -875 ml      Physical Exam   Constitutional: She is oriented to person, place, and time. She appears well-developed and well-nourished. No distress.   Cardiovascular: Normal rate and regular rhythm.   Murmur heard.  Pulmonary/Chest: Effort normal and breath sounds normal. No respiratory distress. She has no wheezes.   Abdominal: Soft. Bowel sounds are normal. She exhibits no distension. There is generalized tenderness.   Musculoskeletal: She exhibits no edema.   Neurological: She is alert and oriented to person, place, and time.   Skin: Skin is warm and dry.   Psychiatric: She has a normal mood and affect. Her behavior is normal.   Nursing note and vitals reviewed.      Significant Labs:   CBC:   Recent Labs   Lab 11/09/19  1053 11/10/19  0610   WBC 20.29* 10.92   HGB 15.7 12.8   HCT 46.1 37.5    174     CMP:   Recent Labs   Lab  11/09/19  1053 11/09/19  1650 11/10/19  0610   * 139 139   K 4.7 4.0 3.4*   CL 97 108 106   CO2 21* 22* 26   * 124* 142*   BUN 59* 38* 18   CREATININE 3.9* 1.7* 0.7   CALCIUM 9.5 7.9* 8.5*   PROT 7.9  --  6.4   ALBUMIN 3.8  --  3.0*   BILITOT 0.6  --  0.5   ALKPHOS 265*  --  164*   *  --  83*   *  --  84*   ANIONGAP 17* 9 7*   EGFRNONAA 12* 32* >60     Lactic Acid:   Recent Labs   Lab 11/09/19  1053 11/09/19  1650 11/10/19  0610   LACTATE 2.6* 1.1 1.6     Magnesium:   Recent Labs   Lab 11/09/19  1053 11/10/19  0610   MG 2.4 1.5*     Troponin:   Recent Labs   Lab 11/09/19  1053 11/09/19  1650 11/10/19  0400   TROPONINI 0.091* 0.107* 0.044*       Significant Imaging: I have reviewed all pertinent imaging results/findings within the past 24 hours.     CT Abd/Pelvis: There is circumferential thickening involving the distal ileum and terminal ileum, this may relate to ileitis, correlation for inflammatory bowel disease or Crohn's disease is needed, note is also made of dilated small bowel loops of the abdomen and pelvis that may relate to superimposed partial obstructive change.    Circumferential thickening and inflammatory change involving the distal transverse colon extending to the rectosigmoid colon, correlation for colitis is needed, this also may relate to inflammatory bowel disease or Crohn's disease.    Mild free fluid of the abdomen and pelvis noted.    Irregular contour of the liver correlation for chronic hepatic disease is needed.    Mild air within the urinary bladder may relate to Worrell catheter although can be seen with infection, clinical and historical correlation is needed.

## 2019-11-10 NOTE — SUBJECTIVE & OBJECTIVE
Past Medical History:   Diagnosis Date    Hypertension     Methadone dependence        Past Surgical History:   Procedure Laterality Date    CHOLECYSTECTOMY  5/18/14     Laparoscopic Cholecystectomy 5/18/14 [place]    MASS EXCISION Right 1998    Thigh fatty cyst       Review of patient's allergies indicates:  No Known Allergies  Family History     Problem Relation (Age of Onset)    Cancer Mother        Tobacco Use    Smoking status: Current Every Day Smoker     Packs/day: 0.25    Smokeless tobacco: Never Used   Substance and Sexual Activity    Alcohol use: No    Drug use: No     Comment: currently on metadone    Sexual activity: Yes     Partners: Male     Review of Systems   Constitutional: Negative for chills, fever and unexpected weight change.   HENT: Negative for congestion and trouble swallowing.    Eyes: Negative for photophobia and visual disturbance.   Respiratory: Positive for shortness of breath. Negative for cough.    Cardiovascular: Positive for chest pain. Negative for leg swelling.   Gastrointestinal: Positive for abdominal pain and diarrhea. Negative for abdominal distention, blood in stool, constipation, nausea and vomiting.   Genitourinary: Negative for dysuria and hematuria.   Musculoskeletal: Negative for arthralgias and myalgias.   Skin: Negative for color change and rash.   Neurological: Negative for dizziness, light-headedness and numbness.   Psychiatric/Behavioral: Negative for agitation and confusion.     Objective:     Vital Signs (Most Recent):  Temp: 97.9 °F (36.6 °C) (11/10/19 1105)  Pulse: 99 (11/10/19 1300)  Resp: 17 (11/10/19 1300)  BP: 133/63 (11/10/19 1300)  SpO2: 98 % (11/10/19 1300) Vital Signs (24h Range):  Temp:  [97.4 °F (36.3 °C)-100 °F (37.8 °C)] 97.9 °F (36.6 °C)  Pulse:  [] 99  Resp:  [16-45] 17  SpO2:  [91 %-100 %] 98 %  BP: ()/(57-97) 133/63     Weight: 67.7 kg (149 lb 4 oz) (11/10/19 0600)  Body mass index is 26.44 kg/m².      Intake/Output  Summary (Last 24 hours) at 11/10/2019 1400  Last data filed at 11/10/2019 0900  Gross per 24 hour   Intake 2110 ml   Output 2985 ml   Net -875 ml       Lines/Drains/Airways     Central Venous Catheter Line                 Percutaneous Central Line Insertion/Assessment - triple lumen  11/09/19 1345 right subclavian 1 day          Drain                 Urethral Catheter 11/09/19 1315 Non-latex;Straight-tip 1 day          Peripheral Intravenous Line                 Peripheral IV - Single Lumen 11/09/19 1040 20 G Left Hand 1 day         Peripheral IV - Single Lumen 11/09/19 1049 20 G;2 in Right Antecubital 1 day                Physical Exam   Constitutional: She is oriented to person, place, and time. She appears well-developed and well-nourished.   HENT:   Head: Normocephalic and atraumatic.   Eyes: Pupils are equal, round, and reactive to light. EOM are normal. No scleral icterus.   Neck: Normal range of motion. Neck supple.   Cardiovascular: Normal rate, regular rhythm, normal heart sounds and intact distal pulses.   Pulmonary/Chest: Effort normal and breath sounds normal. No respiratory distress.   Abdominal: Soft. Bowel sounds are normal. She exhibits no distension. There is tenderness (Diffuse). There is guarding (voluntary).   Musculoskeletal: Normal range of motion. She exhibits no edema.   Neurological: She is alert and oriented to person, place, and time.   No asterixis   Skin: Skin is warm and dry. No rash noted. No erythema.   Psychiatric: She has a normal mood and affect. Her behavior is normal.   Nursing note and vitals reviewed.      Significant Labs:  All pertinent lab results from the last 24 hours have been reviewed.    Significant Imaging:  Imaging results within the past 24 hours have been reviewed.

## 2019-11-10 NOTE — PROGRESS NOTES
Patient c/p pain out of proportion, 10/10   On methadone at home        CT A/P  There is circumferential thickening involving the distal ileum and terminal ileum, this may relate to ileitis, correlation for inflammatory bowel disease or Crohn's disease is needed, note is also made of dilated small bowel loops of the abdomen and pelvis that may relate to superimposed partial obstructive change.  Circumferential thickening and inflammatory change involving the distal transverse colon extending to the rectosigmoid colon, correlation for colitis is needed, this also may relate to inflammatory bowel disease or Crohn's disease.  Mild free fluid of the abdomen and pelvis noted.  Irregular contour of the liver correlation for chronic hepatic disease is needed.  Mild air within the urinary bladder may relate to Worrell catheter although can be seen with infection, clinical and historical correlation is needed.      Res :   - will give IV Dilaudid 0.5 mg once   - though CT doesn't show any bowel perforation, her pain out of proportion, lactic acidosis, etc is concerning. CT s/o IBD with obstruction, colitis. Will involve surgery now to r/o any bowel emergency, and GI consult for AM       6:54 AM   Patient asking for Methadone   - dose to be confirmed by RN from methadone clinic

## 2019-11-10 NOTE — NURSING
Pt is screaming, reporting severe abdominal pain, BP stable 136/62, tachycardic,  ,  I called eICU physician again, Dr ordered another dose of hydromorphone, and recommended that we call the hospitalist to come do a bedside physical examination and get surgery on board.   Hosptalist was called, spoke with MARITA Xavier, she stated she is in the ED right now, but will come at bedside.

## 2019-11-10 NOTE — PLAN OF CARE
VN cued into patients room for rounding. VN role explained and informed pt that VN will be working alongside the bedside care team throughout the day. Pt verbalized understanding that VN is available for any questions and education, and nurse and PCT will continue hourly rounding at bedside. Denies any pain, nausea, or vomiting at this time. Admits to continues diarrhea. Fall education provided. Allotted time given for questions - all questions answered. Will continue to monitor and intervene PRN.

## 2019-11-10 NOTE — EICU
Bedside nurse called to report pain med given 20 minutes ago is not effective. Pt with hs of methadone dependence. Pt reports adb pain is worst she has ever had. 10/10. Informed Kika Pacheco.

## 2019-11-10 NOTE — PLAN OF CARE
Patient having severe abdominal pain rated 10/10, unrelieved by pain medications.  Assessed patient at bedside, abdomen soft, non-distended, hypoactive bowel sounds, tenderness noted on palpation to lower abdominal quadrants.  Surgery consulted by EICU and are currently at bedside, will await surgery recommendations.

## 2019-11-10 NOTE — CONSULTS
[facility]/General Surgery  Consultation Note/ History and Physical      SUBJECTIVE:     Reason for Consultation:   N/V/D (Reports N/V/D since yesterday and  reports occasionally seems to be delusional. )      Chief Complaint:   Per triage note--N/V/D (Reports N/V/D since yesterday and  reports occasionally seems to be delusional. )      History of Present Illness:  Ms. Meza is a 59 y.o. female with hx of lap luciana and chronic methadone for opiate addiction presents with 2 days n/v/d and abdominal pain. Patient was in ED hypotensive with [facility] fluid depleted. Briefly on pressors. Admitted to ICU and resuscitated. Surgery paged in early am to r/o acute abdomen. Patient states three days ago with no change in food intake, illness, medications, she began have acute onset abdominal cramping with associated nausea, vomited once, and constant diarrhea. Presented to ED with above complaints. On evaluation patient complains of abdominal pain s/p 8 mg morphine and 1 mg dilaudid. With diarrhea on the bedpan. CT scan shows some thickening of small bowel and colon, no free air, mild free fluid in pelvis.     Allergies:  Review of patient's allergies indicates:  No Known Allergies    Home Medications:  No current facility-administered medications on file prior to encounter.      Current Outpatient Medications on File Prior to Encounter   Medication Sig    diclofenac (VOLTAREN) 75 MG EC tablet Take 1 tablet (75 mg total) by mouth 2 (two) times daily as needed.    escitalopram oxalate (LEXAPRO) 10 MG tablet Take 10 mg by mouth once daily.    estradiol 10 mcg Tab Place vaginally.    lisinopril 10 MG tablet Take 10 mg by mouth once daily.    methadone (DOLOPHINE) 5 MG tablet Take 90 mg by mouth once daily.     methylPREDNISolone (MEDROL DOSEPACK) 4 mg tablet Take as directed       Past Medical History:   Diagnosis Date    Hypertension     Methadone dependence      Past Surgical History:    Procedure Laterality Date    CHOLECYSTECTOMY  5/18/14     Laparoscopic Cholecystectomy 5/18/14 [place]    MASS EXCISION Right 1998    Thigh fatty cyst     Family History   Problem Relation Age of Onset    Cancer Mother      Social History     Tobacco Use    Smoking status: Current Every Day Smoker     Packs/day: 0.25    Smokeless tobacco: Never Used   Substance Use Topics    Alcohol use: No    Drug use: No     Comment: currently on metadone        Review of Systems:  Constitutional: no fever or chills  Eyes: no visual changes  ENT: no nasal congestion or sore throat  Respiratory: no cough or shortness of breath  Cardiovascular: no chest pain or palpitations  Gastrointestinal: no nausea or vomiting, no abdominal pain or change in bowel habits  Genitourinary: no hematuria or dysuria  Integument/Breast: no rash or pruritis  Hematologic/Lymphatic: no easy bruising or lymphadenopathy  Musculoskeletal: no arthralgias or myalgias  Neurological: no seizures or tremors  Behavioral/Psych: no auditory or visual hallucinations  Endocrine: no heat or cold intolerance    OBJECTIVE:     Vital Signs:  Temp: 97.6 °F (36.4 °C) (11/10/19 0703)  Pulse: (!) 114 (11/10/19 0703)  Resp: (!) 39 (11/10/19 0703)  BP: (!) 144/97 (11/10/19 0703)  SpO2: (!) 94 % (11/10/19 0703)    Physical Exam:  General: well developed, well nourished, mild distress  HEENT: normocephalic, atraumatic, hearing grossly normal bilaterally, mucous membranes moist, EOM intact, no scleral icterus  Neck: supple, symmetrical, trachea midline, no JVD  Lungs:  clear to auscultation bilaterally and normal respiratory effort  Cardiovascular: regular rate and rhythm.  Extremities: no cyanosis or edema, or clubbing, distal pulses palpable and symmetric  Abdomen: soft, diffusely tender, worse in the LLQ, with rebound no guarding  Skin: Skin color, texture, turgor normal. No rashes or lesions  Musculoskeletal:no clubbing, cyanosis, no deformities  Neurologic: No  focal numbness or weakness  Psych/Behavioral:  Alert and oriented, appropriate affect.    Laboratory:  Labs Reviewed   CBC W/ AUTO DIFFERENTIAL - Abnormal; Notable for the following components:       Result Value    WBC 20.29 (*)     Gran # (ANC) 17.4 (*)     Mono # 1.2 (*)     Gran% 86.0 (*)     Lymph% 8.2 (*)     All other components within normal limits   COMPREHENSIVE METABOLIC PANEL - Abnormal; Notable for the following components:    Sodium 135 (*)     CO2 21 (*)     Glucose 143 (*)     BUN, Bld 59 (*)     Creatinine 3.9 (*)     Alkaline Phosphatase 265 (*)      (*)      (*)     Anion Gap 17 (*)     eGFR if  14 (*)     eGFR if non  12 (*)     All other components within normal limits   LACTIC ACID, PLASMA - Abnormal; Notable for the following components:    Lactate (Lactic Acid) 2.6 (*)     All other components within normal limits   TROPONIN I - Abnormal; Notable for the following components:    Troponin I 0.091 (*)     All other components within normal limits   URINALYSIS, REFLEX TO URINE CULTURE - Abnormal; Notable for the following components:    Color, UA Orange (*)     Specific Gravity, UA >=1.030 (*)     Protein, UA 1+ (*)     Ketones, UA 1+ (*)     All other components within normal limits    Narrative:     Preferred Collection Type->Urine, Clean Catch   URINALYSIS MICROSCOPIC - Abnormal; Notable for the following components:    Hyaline Casts, UA 50 (*)     All other components within normal limits    Narrative:     Preferred Collection Type->Urine, Clean Catch   INFLUENZA A & B BY MOLECULAR   MAGNESIUM   PROTIME-INR   APTT   TSH   LIPASE   DRUG SCREEN PANEL, URINE EMERGENCY    Narrative:     Preferred Collection Type->Urine, Clean Catch       Diagnostic Results:  Imaging Results          X-Ray Chest AP Portable (Final result)  Result time 11/09/19 14:25:11    Final result by Lucien Aguila DO (11/09/19 14:25:11)                 Impression:      No  acute cardiopulmonary process.      Electronically signed by: Lucien Aguila DO  Date:    11/09/2019  Time:    14:25             Narrative:    EXAMINATION:  XR CHEST AP PORTABLE    CLINICAL HISTORY:  Presence of other vascular implants and grafts    TECHNIQUE:  Single frontal view of the chest was performed.    COMPARISON:  11/09/2019    FINDINGS:  No infiltrates or pleural effusions are identified.  The heart does not appear to be enlarged for a portable exam.  A right-sided central venous catheter is in place with the tip overlying the mid SVC.  No pneumothorax.                               US Abdomen Limited (Final result)  Result time 11/09/19 13:40:30    Final result by Wild Colon MD (11/09/19 13:40:30)                 Impression:      Gallbladder not identified compatible with reported history of prior surgical removal.    No biliary ductal dilatation.    Mildly dilated pancreatic duct, nonspecific.  Further evaluation with elective/nonemergent MRCP or ERCP can be obtained as warranted.      Electronically signed by: Wild Colon MD  Date:    11/09/2019  Time:    13:40             Narrative:    EXAMINATION:  US ABDOMEN LIMITED    CLINICAL HISTORY:  RUQ - biliary tree please;    TECHNIQUE:  Limited ultrasound of the right upper quadrant of the abdomen (including pancreas, liver, gallbladder, common bile duct, and spleen) was performed.    COMPARISON:  None.    FINDINGS:  Liver: Normal in size, measuring 15.8 cm. Homogeneous echotexture. No focal hepatic lesions.    Gallbladder: Not identified compatible with reported history of prior surgical removal.  Sonographic Daniel sign is negative.    Biliary system: The common duct is not dilated, measuring 4-5 mm.  No intrahepatic ductal dilatation.    Spleen: Normal in size and echotexture, measuring 11 cm.    Miscellaneous: No upper abdominal ascites.  Pancreatic duct measures up to 3-4 mm at the imaged neck and proximal body.  Remainder of the imaged pancreas  is within normal limits.  Main portal vein appears patent.                                X-Ray Chest AP Portable (Final result)  Result time 11/09/19 13:13:17    Final result by Wild Colon MD (11/09/19 13:13:17)                 Impression:      Bilateral diffuse nonspecific interstitial coarsening which can be seen with pulmonary edema or interstitial pneumonia.  No focal consolidation.  Follow-up as warranted.    This report was flagged in Epic as abnormal.      Electronically signed by: Wild Colon MD  Date:    11/09/2019  Time:    13:13             Narrative:    EXAMINATION:  XR CHEST AP PORTABLE    CLINICAL HISTORY:  shortness of breath;    TECHNIQUE:  Single frontal view of the chest was performed.    COMPARISON:  Chest radiograph 07/30/2015    FINDINGS:  Monitoring leads overlie the chest.  Cardiomediastinal silhouette is midline and within normal limits.  The lungs are symmetrically well expanded with bilateral diffuse nonspecific interstitial coarsening.  No focal consolidation, pleural effusion or pneumothorax.  Pulmonary vasculature and hilar regions are within normal limits.  Osseous structures show mild degenerative change without acute process seen.  PA and lateral views can be obtained.                                ASSESSMENT/PLAN:     Ms. Meza is a 59 y.o. female with n/v/d x3 days presented in [facility], hypotensive, with unknown etiology of abdominal pain    - chronic pain medication makes pain control difficult  - no acute abdomen no indication for emergent surgery  - repeat labs  - continue resuscitation  - unclear etiology of abdominal pain, wbc 22 on presentation now 10  - appreciate GI reccomendations  - will order repeat ct with PO contrast in am  - serial exams  - if worsening exam please call   - will follow    1. Weakness    2. Shock    3. S/P PICC central line placement        Keith Jain MD  [facility] Surgery PGY2  11/10/2019     History and physical done  Assessment and plan reviewed  agree with above plan, assessment  H/o nausea, vomiting and diarrhea with abdominal pain  abd not surgical  Will observe

## 2019-11-10 NOTE — NURSING
Spoke with Dr Pacheco again this morning, pt very restless, tachycardic , /85/ MD thinks pt may be going through Methadone withdrawal, she got her last dose yesterday 11/09/19 morning, has not gotten this morning dose. MD recommended to reach out to primary team and ask if 0900 am dose can be given sooner.

## 2019-11-10 NOTE — CONSULTS
[facility]  Gastroenterology  Consult Note    Patient Name: Yu Meza  MRN: [id number]  Admission Date: 11/9/2019  Hospital Length of Stay: 1 days  Code Status: Full Code   Attending Provider: Gatito Vyas MD   Consulting Provider: Yayo Serna MD  Primary Care Physician: Keisha Silva NP  Principal Problem:Shock    Consults  Subjective:     HPI:  60 yo female with HTN and methadone dependence that presented to [facility] ED for evaluation of abdominal pain with associated diarrhea, nausea and vomiting.  Patient reports symptoms began 3 days prior to presentation and gradually progressed. Her blood pressure was in the 70s systolic upon arrival to the ED and did not respond sufficiently to IV fluids, so she was started on norepinephrine.   in the emergency department patient was found to have significant leukocytosis and elevated liver tests and inflammatory markers..  CT showed concerning thickening in the TI/cecum as well as distal transverse colon.  Patient was admitted to the ICU overnight and was resuscitated.  Today on interview patient reports abdominal pain has improved significantly.  She also reports diarrhea has slightly improved as well.    Patient reports mother with colon cancer diagnosed in his 60s.  Patient has never had a colonoscopy.    Past Medical History:   Diagnosis Date    Hypertension     Methadone dependence        Past Surgical History:   Procedure Laterality Date    CHOLECYSTECTOMY  5/18/14     Laparoscopic Cholecystectomy 5/18/14 [place]    MASS EXCISION Right 1998    Thigh fatty cyst       Review of patient's allergies indicates:  No Known Allergies  Family History     Problem Relation (Age of Onset)    Cancer Mother        Tobacco Use    Smoking status: Current Every Day Smoker     Packs/day: 0.25    Smokeless tobacco: Never Used   Substance and Sexual Activity    Alcohol use: No    Drug use: No     Comment: currently on metadone     Sexual activity: Yes     Partners: Male     Review of Systems   Constitutional: Negative for chills, fever and unexpected weight change.   HENT: Negative for congestion and trouble swallowing.    Eyes: Negative for photophobia and visual disturbance.   Respiratory: Positive for shortness of breath. Negative for cough.    Cardiovascular: Positive for chest pain. Negative for leg swelling.   Gastrointestinal: Positive for abdominal pain and diarrhea. Negative for abdominal distention, blood in stool, constipation, nausea and vomiting.   Genitourinary: Negative for dysuria and hematuria.   Musculoskeletal: Negative for arthralgias and myalgias.   Skin: Negative for color change and rash.   Neurological: Negative for dizziness, light-headedness and numbness.   Psychiatric/Behavioral: Negative for agitation and confusion.     Objective:     Vital Signs (Most Recent):  Temp: 97.9 °F (36.6 °C) (11/10/19 1105)  Pulse: 99 (11/10/19 1300)  Resp: 17 (11/10/19 1300)  BP: 133/63 (11/10/19 1300)  SpO2: 98 % (11/10/19 1300) Vital Signs (24h Range):  Temp:  [97.4 °F (36.3 °C)-100 °F (37.8 °C)] 97.9 °F (36.6 °C)  Pulse:  [] 99  Resp:  [16-45] 17  SpO2:  [91 %-100 %] 98 %  BP: ()/(57-97) 133/63     Weight: 67.7 kg (149 lb 4 oz) (11/10/19 0600)  Body mass index is 26.44 kg/m².      Intake/Output Summary (Last 24 hours) at 11/10/2019 1400  Last data filed at 11/10/2019 0900  Gross per 24 hour   Intake 2110 ml   Output 2985 ml   Net -875 ml       Lines/Drains/Airways     Central Venous Catheter Line                 Percutaneous Central Line Insertion/Assessment - triple lumen  11/09/19 1345 right subclavian 1 day          Drain                 Urethral Catheter 11/09/19 1315 Non-latex;Straight-tip 1 day          Peripheral Intravenous Line                 Peripheral IV - Single Lumen 11/09/19 1040 20 G Left Hand 1 day         Peripheral IV - Single Lumen 11/09/19 1049 20 G;2 in Right Antecubital 1 day                 Physical Exam   Constitutional: She is oriented to person, place, and time. She appears well-developed and well-nourished.   HENT:   Head: Normocephalic and atraumatic.   Eyes: Pupils are equal, round, and reactive to light. EOM are normal. No scleral icterus.   Neck: Normal range of motion. Neck supple.   Cardiovascular: Normal rate, regular rhythm, normal heart sounds and intact distal pulses.   Pulmonary/Chest: Effort normal and breath sounds normal. No respiratory distress.   Abdominal: Soft. Bowel sounds are normal. She exhibits no distension. There is tenderness (Diffuse). There is guarding (voluntary).   Musculoskeletal: Normal range of motion. She exhibits no edema.   Neurological: She is alert and oriented to person, place, and time.   No asterixis   Skin: Skin is warm and dry. No rash noted. No erythema.   Psychiatric: She has a normal mood and affect. Her behavior is normal.   Nursing note and vitals reviewed.      Significant Labs:  All pertinent lab results from the last 24 hours have been reviewed.    Significant Imaging:  Imaging results within the past 24 hours have been reviewed.    Assessment/Plan:     Diarrhea of presumed infectious origin  Infectious versus inflammatory  Stool studies pending.  Follow up  Given abnormal CT findings patient would merit colonoscopy while inpatient.  Offered to patient however she chose to defer.  Will consider prior to discharge    Abnormal liver enzymes  Etiology uncertain, question secondary to ischemic event versus sepsis in the setting of chronic liver disease/hepatitis-C  Trend CMP  Ultrasound with mild PD dilation.  Consider CT pancreatic protocol when clinical status improves        Thank you for your consult. I will follow-up with patient. Please contact us if you have any additional questions.    Yayo Serna MD  Gastroenterology  [facility]

## 2019-11-10 NOTE — HPI
60 yo female with HTN and methadone dependence that presented to [facility] ED for evaluation of abdominal pain with associated diarrhea, nausea and vomiting.  Patient reports symptoms began 3 days prior to presentation and gradually progressed. Her blood pressure was in the 70s systolic upon arrival to the ED and did not respond sufficiently to IV fluids, so she was started on norepinephrine.  in the emergency department patient was found to have significant leukocytosis and elevated liver tests and inflammatory markers..  CT showed concerning thickening in the TI/cecum as well as distal transverse colon.  Patient was admitted to the ICU overnight and was resuscitated.  Today on interview patient reports abdominal pain has improved significantly.  She also reports diarrhea has slightly improved as well.    Patient reports mother with colon cancer diagnosed in his 60s.  Patient has never had a colonoscopy.

## 2019-11-10 NOTE — ASSESSMENT & PLAN NOTE
Etiology uncertain, question secondary to ischemic event versus sepsis in the setting of chronic liver disease/hepatitis-C  Trend CMP  Ultrasound with mild PD dilation.  Consider CT pancreatic protocol when clinical status improves

## 2019-11-10 NOTE — NURSING
Pt constantly reporting abdominal pain rated 10/10 since the beginning of my shift. I have been given her Morphine 4 mg IVP, no relief reported. I called eICU for pain management

## 2019-11-11 ENCOUNTER — CLINICAL SUPPORT (OUTPATIENT)
Dept: SMOKING CESSATION | Facility: CLINIC | Age: 59
End: 2019-11-11
Payer: COMMERCIAL

## 2019-11-11 DIAGNOSIS — F17.210 CIGARETTE SMOKER: Primary | ICD-10-CM

## 2019-11-11 PROBLEM — I10 ESSENTIAL HYPERTENSION, BENIGN: Chronic | Status: ACTIVE | Noted: 2019-11-09

## 2019-11-11 PROBLEM — F32.9 MAJOR DEPRESSIVE DISORDER: Chronic | Status: ACTIVE | Noted: 2019-11-09

## 2019-11-11 PROBLEM — F11.20 METHADONE DEPENDENCE: Chronic | Status: ACTIVE | Noted: 2019-11-09

## 2019-11-11 LAB
ALBUMIN SERPL BCP-MCNC: 2.8 G/DL (ref 3.5–5.2)
ALP SERPL-CCNC: 120 U/L (ref 55–135)
ALT SERPL W/O P-5'-P-CCNC: 78 U/L (ref 10–44)
ANION GAP SERPL CALC-SCNC: 10 MMOL/L (ref 8–16)
AST SERPL-CCNC: 62 U/L (ref 10–40)
BASOPHILS # BLD AUTO: ABNORMAL K/UL (ref 0–0.2)
BASOPHILS NFR BLD: 0 % (ref 0–1.9)
BILIRUB SERPL-MCNC: 0.4 MG/DL (ref 0.1–1)
BUN SERPL-MCNC: 9 MG/DL (ref 8–23)
C DIFF GDH STL QL: NEGATIVE
C DIFF TOX A+B STL QL IA: NEGATIVE
CALCIUM SERPL-MCNC: 8.9 MG/DL (ref 8.7–10.5)
CHLORIDE SERPL-SCNC: 102 MMOL/L (ref 95–110)
CO2 SERPL-SCNC: 26 MMOL/L (ref 23–29)
CREAT SERPL-MCNC: 0.6 MG/DL (ref 0.5–1.4)
DIFFERENTIAL METHOD: ABNORMAL
EOSINOPHIL # BLD AUTO: ABNORMAL K/UL (ref 0–0.5)
EOSINOPHIL NFR BLD: 2 % (ref 0–8)
ERYTHROCYTE [DISTWIDTH] IN BLOOD BY AUTOMATED COUNT: 13.1 % (ref 11.5–14.5)
EST. GFR  (AFRICAN AMERICAN): >60 ML/MIN/1.73 M^2
EST. GFR  (NON AFRICAN AMERICAN): >60 ML/MIN/1.73 M^2
GLUCOSE SERPL-MCNC: 95 MG/DL (ref 70–110)
HCT VFR BLD AUTO: 36.1 % (ref 37–48.5)
HGB BLD-MCNC: 11.9 G/DL (ref 12–16)
LYMPHOCYTES # BLD AUTO: ABNORMAL K/UL (ref 1–4.8)
LYMPHOCYTES NFR BLD: 24 % (ref 18–48)
MAGNESIUM SERPL-MCNC: 1.2 MG/DL (ref 1.6–2.6)
MCH RBC QN AUTO: 28.3 PG (ref 27–31)
MCHC RBC AUTO-ENTMCNC: 33 G/DL (ref 32–36)
MCV RBC AUTO: 86 FL (ref 82–98)
MONOCYTES # BLD AUTO: ABNORMAL K/UL (ref 0.3–1)
MONOCYTES NFR BLD: 4 % (ref 4–15)
MYELOCYTES NFR BLD MANUAL: 1 %
NEUTROPHILS NFR BLD: 55 % (ref 38–73)
NEUTS BAND NFR BLD MANUAL: 14 %
PHOSPHATE SERPL-MCNC: 1.2 MG/DL (ref 2.7–4.5)
PLATELET # BLD AUTO: 173 K/UL (ref 150–350)
PMV BLD AUTO: 9.2 FL (ref 9.2–12.9)
POTASSIUM SERPL-SCNC: 3.2 MMOL/L (ref 3.5–5.1)
PROT SERPL-MCNC: 6.5 G/DL (ref 6–8.4)
RBC # BLD AUTO: 4.21 M/UL (ref 4–5.4)
SODIUM SERPL-SCNC: 138 MMOL/L (ref 136–145)
WBC # BLD AUTO: 11.65 K/UL (ref 3.9–12.7)
WBC TOXIC VACUOLES BLD QL SMEAR: PRESENT

## 2019-11-11 PROCEDURE — 63600175 PHARM REV CODE 636 W HCPCS: Performed by: HOSPITALIST

## 2019-11-11 PROCEDURE — 99232 SBSQ HOSP IP/OBS MODERATE 35: CPT | Mod: ,,, | Performed by: INTERNAL MEDICINE

## 2019-11-11 PROCEDURE — 99232 PR SUBSEQUENT HOSPITAL CARE,LEVL II: ICD-10-PCS | Mod: ,,, | Performed by: INTERNAL MEDICINE

## 2019-11-11 PROCEDURE — 99407 PR TOBACCO USE CESSATION INTENSIVE >10 MINUTES: ICD-10-PCS | Mod: S$GLB,,,

## 2019-11-11 PROCEDURE — 25000003 PHARM REV CODE 250: Performed by: HOSPITALIST

## 2019-11-11 PROCEDURE — 80053 COMPREHEN METABOLIC PANEL: CPT

## 2019-11-11 PROCEDURE — 99999 PR PBB SHADOW E&M-EST. PATIENT-LVL I: ICD-10-PCS | Mod: PBBFAC,,,

## 2019-11-11 PROCEDURE — 83735 ASSAY OF MAGNESIUM: CPT

## 2019-11-11 PROCEDURE — 99407 BEHAV CHNG SMOKING > 10 MIN: CPT | Mod: S$GLB,,,

## 2019-11-11 PROCEDURE — 85027 COMPLETE CBC AUTOMATED: CPT

## 2019-11-11 PROCEDURE — S4991 NICOTINE PATCH NONLEGEND: HCPCS | Performed by: HOSPITALIST

## 2019-11-11 PROCEDURE — 99999 PR PBB SHADOW E&M-EST. PATIENT-LVL I: CPT | Mod: PBBFAC,,,

## 2019-11-11 PROCEDURE — 11000001 HC ACUTE MED/SURG PRIVATE ROOM

## 2019-11-11 PROCEDURE — 84100 ASSAY OF PHOSPHORUS: CPT

## 2019-11-11 PROCEDURE — 85007 BL SMEAR W/DIFF WBC COUNT: CPT

## 2019-11-11 PROCEDURE — 36415 COLL VENOUS BLD VENIPUNCTURE: CPT

## 2019-11-11 PROCEDURE — 25000003 PHARM REV CODE 250: Performed by: INTERNAL MEDICINE

## 2019-11-11 PROCEDURE — S0030 INJECTION, METRONIDAZOLE: HCPCS | Performed by: HOSPITALIST

## 2019-11-11 RX ORDER — POLYETHYLENE GLYCOL 3350, SODIUM SULFATE ANHYDROUS, SODIUM BICARBONATE, SODIUM CHLORIDE, POTASSIUM CHLORIDE 236; 22.74; 6.74; 5.86; 2.97 G/4L; G/4L; G/4L; G/4L; G/4L
4000 POWDER, FOR SOLUTION ORAL ONCE
Status: DISCONTINUED | OUTPATIENT
Start: 2019-11-11 | End: 2019-11-12 | Stop reason: HOSPADM

## 2019-11-11 RX ORDER — NICOTINE 7MG/24HR
1 PATCH, TRANSDERMAL 24 HOURS TRANSDERMAL DAILY
Status: DISCONTINUED | OUTPATIENT
Start: 2019-11-11 | End: 2019-11-12 | Stop reason: HOSPADM

## 2019-11-11 RX ORDER — SYRING-NEEDL,DISP,INSUL,0.3 ML 29 G X1/2"
296 SYRINGE, EMPTY DISPOSABLE MISCELLANEOUS ONCE
Status: COMPLETED | OUTPATIENT
Start: 2019-11-11 | End: 2019-11-11

## 2019-11-11 RX ADMIN — METHADONE HYDROCHLORIDE 90 MG: 10 TABLET ORAL at 09:11

## 2019-11-11 RX ADMIN — ESCITALOPRAM OXALATE 10 MG: 10 TABLET ORAL at 08:11

## 2019-11-11 RX ADMIN — MAGNESIUM CITRATE 296 ML: 1.75 LIQUID ORAL at 03:11

## 2019-11-11 RX ADMIN — METRONIDAZOLE 500 MG: 500 INJECTION, SOLUTION INTRAVENOUS at 01:11

## 2019-11-11 RX ADMIN — SODIUM CHLORIDE, SODIUM LACTATE, POTASSIUM CHLORIDE, AND CALCIUM CHLORIDE: .6; .31; .03; .02 INJECTION, SOLUTION INTRAVENOUS at 05:11

## 2019-11-11 RX ADMIN — ACETAMINOPHEN 650 MG: 325 TABLET ORAL at 01:11

## 2019-11-11 RX ADMIN — METRONIDAZOLE 500 MG: 500 INJECTION, SOLUTION INTRAVENOUS at 04:11

## 2019-11-11 RX ADMIN — CEFTRIAXONE 2 G: 2 INJECTION, SOLUTION INTRAVENOUS at 03:11

## 2019-11-11 RX ADMIN — METRONIDAZOLE 500 MG: 500 INJECTION, SOLUTION INTRAVENOUS at 09:11

## 2019-11-11 RX ADMIN — NICOTINE 1 PATCH: 7 PATCH, EXTENDED RELEASE TRANSDERMAL at 11:11

## 2019-11-11 NOTE — NURSING
Report to incoming nurse provided, pt stable, denies any pain or discomfort at this time, call light within her reach, bed in low position, bed alarm on. Encourage to call as needed, voices understanding. Contact/special contact precautions maintained.

## 2019-11-11 NOTE — PLAN OF CARE
1915H Patient is awake and orientedx4. Care plan explained and verbalized understanding. VIP care model explained. On room air, no complaints of shortness of breath. On heart monitor running Sinus Tachycardia at 103bpm. IV site to the left hand 20G and right hand 20G; Lactated Ringers running at 125ml/hr. Maintained on clear liquid diet. Maintained on fall precaution. Bed in lowest position, bed alarms on, call light within reach and instructed to call for help when needed. Will continue  to monitor.    Complaints of tenderness to lower abdomen, PRN pain medication given.

## 2019-11-11 NOTE — ASSESSMENT & PLAN NOTE
Colitis  N/V/Diarrhea  Presumed infectious but possibly inflammatory or ischemic  General surgery and GI consulted.   Appreciate their assistance.   Only a soft BM noted by nurses since being in ICU.   C.diff to be collected if she has diarrhea again.

## 2019-11-11 NOTE — PROGRESS NOTES
[facility] Hospital Medicine  Progress Note    Patient Name: Yu Meza  MRN: [id number]  Patient Class: IP- Inpatient   Admission Date: 11/9/2019  Length of Stay: 1 days  Attending Physician: Gatito Vyas MD  Primary Care Provider: Keisha Silva NP        Subjective:     Principal Problem:Shock        HPI:  Ms. Meza is a 58 yo female with HTN and methadone dependence that presented to [facility] ED for evaluation of nausea, vomiting, and diarrhea over the last day. She and her  report her having a few episodes of emesis and 4 large, loose bowel movements over the last day. She has some nausea still and reports some dizziness. She says that she has been urinating normally. She denies any recent sick contacts and her  is not having any of the same symptoms. She denies any abdominal pain, dysuria, fever, or cough. She reports mild SOB currently. Her blood pressure was in the 70s systolic upon arrival to the ED and did not respond sufficiently to IV fluids, so she was started on norepinephrine.     Overview/Hospital Course:  No notes on file    Interval History: Had abdominal pain through the night and could not get relief despite pain medication. Doing a little better this AM after getting morning methadone. She reports a few episodes of diarrhea overnight, but only 1 episode recorded.     Review of Systems   Constitutional: Negative for chills and fever.   Respiratory: Negative for cough and shortness of breath.    Cardiovascular: Negative for chest pain and palpitations.   Gastrointestinal: Negative for nausea and vomiting.     Objective:     Vital Signs (Most Recent):  Temp: 97.6 °F (36.4 °C) (11/10/19 0703)  Pulse: 105 (11/10/19 1000)  Resp: 16 (11/10/19 1000)  BP: (!) 162/73 (11/10/19 1000)  SpO2: 99 % (11/10/19 1000) Vital Signs (24h Range):  Temp:  [96.3 °F (35.7 °C)-100 °F (37.8 °C)] 97.6 °F (36.4 °C)  Pulse:  [] 105  Resp:  [16-39] 16  SpO2:  [93 %-100 %]  99 %  BP: ()/(36-97) 162/73     Weight: 67.7 kg (149 lb 4 oz)  Body mass index is 26.44 kg/m².    Intake/Output Summary (Last 24 hours) at 11/10/2019 1027  Last data filed at 11/10/2019 0900  Gross per 24 hour   Intake 2110 ml   Output 2985 ml   Net -875 ml      Physical Exam   Constitutional: She is oriented to person, place, and time. She appears well-developed and well-nourished. No distress.   Cardiovascular: Normal rate and regular rhythm.   Murmur heard.  Pulmonary/Chest: Effort normal and breath sounds normal. No respiratory distress. She has no wheezes.   Abdominal: Soft. Bowel sounds are normal. She exhibits no distension. There is generalized tenderness.   Musculoskeletal: She exhibits no edema.   Neurological: She is alert and oriented to person, place, and time.   Skin: Skin is warm and dry.   Psychiatric: She has a normal mood and affect. Her behavior is normal.   Nursing note and vitals reviewed.      Significant Labs:   CBC:   Recent Labs   Lab 11/09/19  1053 11/10/19  0610   WBC 20.29* 10.92   HGB 15.7 12.8   HCT 46.1 37.5    174     CMP:   Recent Labs   Lab 11/09/19  1053 11/09/19  1650 11/10/19  0610   * 139 139   K 4.7 4.0 3.4*   CL 97 108 106   CO2 21* 22* 26   * 124* 142*   BUN 59* 38* 18   CREATININE 3.9* 1.7* 0.7   CALCIUM 9.5 7.9* 8.5*   PROT 7.9  --  6.4   ALBUMIN 3.8  --  3.0*   BILITOT 0.6  --  0.5   ALKPHOS 265*  --  164*   *  --  83*   *  --  84*   ANIONGAP 17* 9 7*   EGFRNONAA 12* 32* >60     Lactic Acid:   Recent Labs   Lab 11/09/19  1053 11/09/19  1650 11/10/19  0610   LACTATE 2.6* 1.1 1.6     Magnesium:   Recent Labs   Lab 11/09/19  1053 11/10/19  0610   MG 2.4 1.5*     Troponin:   Recent Labs   Lab 11/09/19  1053 11/09/19  1650 11/10/19  0400   TROPONINI 0.091* 0.107* 0.044*       Significant Imaging: I have reviewed all pertinent imaging results/findings within the past 24 hours.     CT Abd/Pelvis: There is circumferential thickening  involving the distal ileum and terminal ileum, this may relate to ileitis, correlation for inflammatory bowel disease or Crohn's disease is needed, note is also made of dilated small bowel loops of the abdomen and pelvis that may relate to superimposed partial obstructive change.    Circumferential thickening and inflammatory change involving the distal transverse colon extending to the rectosigmoid colon, correlation for colitis is needed, this also may relate to inflammatory bowel disease or Crohn's disease.    Mild free fluid of the abdomen and pelvis noted.    Irregular contour of the liver correlation for chronic hepatic disease is needed.    Mild air within the urinary bladder may relate to Worrell catheter although can be seen with infection, clinical and historical correlation is needed.      Assessment/Plan:      * Shock  Lactic acidosis - resolved  Leukocytosis - Resolved  Unsure of the etiology.   Possibly due to hypovolemia given the vomiting and diarrhea, and quick resolution with volume resuscitation.   Off norepinephrine since 1542 11/9.    Continue LR but decrease rate.    Repeat lactate are WNL.   Continue ceftriaxone and metronidazole empirically for now.    Acute renal failure with tubular necrosis  Hyponatremia - Resolved  Continue to monitor renal function.   Continue LR.   Creatinine is down to 0.7.      Ileitis  Colitis  N/V/Diarrhea  Presumed infectious but possibly inflammatory or ischemic  General surgery and GI consulted.   Appreciate their assistance.   Only a soft BM noted by nurses since being in ICU.   C.diff to be collected if she has diarrhea again.       Hypokalemia  Give KCl.       Hypomagnesemia  Give mag sulfate IV.       Hypophosphatemia  Give neutra phos.       Abnormal liver enzymes  A little higher than prior.   RUQ US fairly unremarkable.   Trend and decreasing currently.       Major depressive disorder  Continue lexapro.       Essential hypertension, benign  Hold home  lisinopril 10 mg daily with the low blood pressure and YADIRA.   SBP ranged 74 to 161.    Methadone dependence  Continue home methadone 90 mg daily.         VTE Risk Mitigation (From admission, onward)         Ordered     IP VTE LOW RISK PATIENT  Once      11/09/19 1456     Place DRAKE hose  Until discontinued      11/09/19 1456     Place sequential compression device  Until discontinued      11/09/19 1456                      Gatito Vyas MD  Department of Hospital Medicine   [facility]

## 2019-11-11 NOTE — PROGRESS NOTES
Individual Follow-Up Form    11/11/2019    Quit Date: To be determined    Clinical Status of Patient: Inpatient    Length of Service: 30 minutes    Comments: Smoking cessation education provided.  Pt states that she has been smoking since the age of 13 and that she smoked 1 pack of cigarettes per day on average.  She states that she has gradually cut down and now smokes only 3 or 4 cigarettes per day but has been unable to quit entirely on her own.  Pt is reporting nicotine withdrawal symptoms. 7 mg nicotine patch ordered. Pt states that she is ready to quit smoking and she was enrolled in the [employer] during this encounter.      Diagnosis: F17.210    Next Visit:  Ambulatory referral to Smoking Cessation program following hospital discharge.

## 2019-11-11 NOTE — CONSULTS
[facility]  Gastroenterology  Consult Note    Inpatient consult to Gastroenterology  Consult performed by: Gila Tabor MD  Consult ordered by: Gatito Vyas MD  Reason for consult: Diarrhea      - See prior consult for further evaluation

## 2019-11-11 NOTE — ASSESSMENT & PLAN NOTE
Hyponatremia - Resolved  Continue to monitor renal function.   Continue LR.   Creatinine is down to 0.7.

## 2019-11-11 NOTE — PLAN OF CARE
VN cued into pt's room for introduction. VN informed pt that VN would be working along side bedside nurse and PCT throughout shift. Level of present pain assessed. At present no distress noted. Patient states feeling considerably better since admission. Thoroughly discussed plan of care with patient and upcoming colonssopy tomorrow.  Discussed with patient High fall risk protocol and interventions that have been initiated and cont be in place for safety. Patient verbalized clear understanding and cooperation using teach back method. Bed alarm presently activated and in use. Will cont to be available to patient and intervene prn.

## 2019-11-11 NOTE — SUBJECTIVE & OBJECTIVE
Subjective:     Interval History: Feeling somewhat better, no n/v, diarrhea resolving. Afebrile.     The following portions of the patient's history were reviewed and updated as appropriate: allergies, current medications, past family history, past medical history, past social history, past surgical history and problem list.      Review of Systems   Constitutional: Negative for appetite change and unexpected weight change.   Respiratory: Negative for shortness of breath and wheezing.    Cardiovascular: Negative for chest pain and palpitations.   Gastrointestinal: Positive for abdominal pain and diarrhea.     Objective:     Vital Signs (Most Recent):  Temp: 97.9 °F (36.6 °C) (11/11/19 1220)  Pulse: 93 (11/11/19 1220)  Resp: 18 (11/11/19 1220)  BP: (!) 124/58 (11/11/19 1220)  SpO2: (!) 94 % (11/11/19 1220) Vital Signs (24h Range):  Temp:  [97.7 °F (36.5 °C)-98.7 °F (37.1 °C)] 97.9 °F (36.6 °C)  Pulse:  [] 93  Resp:  [14-25] 18  SpO2:  [94 %-99 %] 94 %  BP: (124-150)/(58-88) 124/58     Weight: 67.5 kg (148 lb 13 oz) (11/11/19 0600)  Body mass index is 26.36 kg/m².      Intake/Output Summary (Last 24 hours) at 11/11/2019 1304  Last data filed at 11/11/2019 1224  Gross per 24 hour   Intake 4700 ml   Output 1052 ml   Net 3648 ml       Lines/Drains/Airways     Peripheral Intravenous Line                 Peripheral IV - Single Lumen 11/11/19 1201 22 G Right;Posterior Hand less than 1 day                Physical Exam   Constitutional: She is oriented to person, place, and time. She appears well-developed and well-nourished. No distress.   HENT:   Head: Normocephalic and atraumatic.   Eyes: Conjunctivae are normal. No scleral icterus.   Neck: Normal range of motion. Neck supple. No tracheal deviation present.   Pulmonary/Chest: Effort normal. No respiratory distress.   Abdominal: Soft. Bowel sounds are normal. She exhibits no distension. There is no tenderness.   Neurological: She is alert and oriented to person,  place, and time.   Skin: Skin is warm and dry. No rash noted. She is not diaphoretic. No erythema.   Psychiatric: She has a normal mood and affect. Her behavior is normal.   Nursing note and vitals reviewed.      Significant Labs:  CBC:   Recent Labs   Lab 11/10/19  0610 11/11/19  0852   WBC 10.92 11.65   HGB 12.8 11.9*   HCT 37.5 36.1*    173         Significant Imaging:  Imaging results within the past 24 hours have been reviewed.

## 2019-11-11 NOTE — ASSESSMENT & PLAN NOTE
Lactic acidosis - resolved  Leukocytosis - Resolved  Unsure of the etiology.   Possibly due to hypovolemia given the vomiting and diarrhea, and quick resolution with volume resuscitation.   Off norepinephrine since 1542 11/9.    Continue LR but decrease rate.    Repeat lactate are WNL.   Continue ceftriaxone and metronidazole empirically for now.

## 2019-11-11 NOTE — PROGRESS NOTES
[facility]  Gastroenterology  Progress Note    Patient Name: Yu Meza  MRN: [id number]  Admission Date: 11/9/2019  Hospital Length of Stay: 2 days  Code Status: Full Code   Attending Provider: Gatito Vysa MD  Consulting Provider: Gila Tabor MD  Primary Care Physician: Keisha Silva NP  Principal Problem: Shock      Subjective:     Interval History: Feeling somewhat better, no n/v, diarrhea resolving. Afebrile.     The following portions of the patient's history were reviewed and updated as appropriate: allergies, current medications, past family history, past medical history, past social history, past surgical history and problem list.      Review of Systems   Constitutional: Negative for appetite change and unexpected weight change.   Respiratory: Negative for shortness of breath and wheezing.    Cardiovascular: Negative for chest pain and palpitations.   Gastrointestinal: Positive for abdominal pain and diarrhea.     Objective:     Vital Signs (Most Recent):  Temp: 97.9 °F (36.6 °C) (11/11/19 1220)  Pulse: 93 (11/11/19 1220)  Resp: 18 (11/11/19 1220)  BP: (!) 124/58 (11/11/19 1220)  SpO2: (!) 94 % (11/11/19 1220) Vital Signs (24h Range):  Temp:  [97.7 °F (36.5 °C)-98.7 °F (37.1 °C)] 97.9 °F (36.6 °C)  Pulse:  [] 93  Resp:  [14-25] 18  SpO2:  [94 %-99 %] 94 %  BP: (124-150)/(58-88) 124/58     Weight: 67.5 kg (148 lb 13 oz) (11/11/19 0600)  Body mass index is 26.36 kg/m².      Intake/Output Summary (Last 24 hours) at 11/11/2019 1304  Last data filed at 11/11/2019 1224  Gross per 24 hour   Intake 4700 ml   Output 1052 ml   Net 3648 ml       Lines/Drains/Airways     Peripheral Intravenous Line                 Peripheral IV - Single Lumen 11/11/19 1201 22 G Right;Posterior Hand less than 1 day                Physical Exam   Constitutional: She is oriented to person, place, and time. She appears well-developed and well-nourished. No distress.   HENT:   Head: Normocephalic  and atraumatic.   Eyes: Conjunctivae are normal. No scleral icterus.   Neck: Normal range of motion. Neck supple. No tracheal deviation present.   Pulmonary/Chest: Effort normal. No respiratory distress.   Abdominal: Soft. Bowel sounds are normal. She exhibits no distension. There is no tenderness.   Neurological: She is alert and oriented to person, place, and time.   Skin: Skin is warm and dry. No rash noted. She is not diaphoretic. No erythema.   Psychiatric: She has a normal mood and affect. Her behavior is normal.   Nursing note and vitals reviewed.      Significant Labs:  CBC:   Recent Labs   Lab 11/10/19  0610 11/11/19  0852   WBC 10.92 11.65   HGB 12.8 11.9*   HCT 37.5 36.1*    173         Significant Imaging:  Imaging results within the past 24 hours have been reviewed.    Assessment/Plan:     Diarrhea of presumed infectious origin  - abnormal ct  - plan for colonoscopy tomorrow  - orders placed    Abnormal liver enzymes  - monitor  - agree with consideration for pancreatic protocol ct vs outpatient eus        Thank you for your consult. I will follow-up with patient. Please contact us if you have any additional questions.    Gila Tabor MD  Gastroenterology  [facility]

## 2019-11-11 NOTE — PROGRESS NOTES
[facility]/General Surgery  Progress Note    Admit Date: 11/9/2019  Hospital Day: 2  Procedure:   none    Subjective:  NAEO. AFVSS  Patient reports still having some abdominal pain, but is improved significantly since yesterday  No fevers overnight, had 1 formed bowel movement  Denies n/v  None f/c, cp/sob  Passing both flatus/BM  Patient is both ambulating/voiding spontaneously      Physical Exam:  Gen: no acute distress. Alert and oriented x3.  HEENT: normocephalic and atraumatic. EOMI.   Resp: unlabored respirations  CV: regular rate, distal pulses intact  Abd: soft, tender in the left lower quadrant to deep palpation, no rebound no guarding  Ext: warm and well perfused  MSK: strength grossly intact  Neuro: no focal deficits, normal sensation    Labs reviewed below- a.m. labs pending, C diff pending  I/O (last 24 hours):  UOP:  Recorded at 901      Assessment/Plan: 59 y.o. female admitted 11/9/2019 with leukocytosis lactic acidosis and hypotension complaining of abdominal pain nausea and vomiting    GI recommends colonoscopy, agree with this evaluation  Currently no acute surgical intervention necessary  Rest of management per primary  Please call with questions    Diet-   clear liquids  Antibiotics- for primary  Prophylaxis- for primary    Dispo:  Pending clinical improvement    Keith Jain MD   [facility] General Surgery, PGY2  11/11/2019       Inpatient Data     Vitals:   Temp:  [97.6 °F (36.4 °C)-98.7 °F (37.1 °C)] 98.2 °F (36.8 °C)  Pulse:  [] 97  Resp:  [14-45] 18  SpO2:  [91 %-100 %] 98 %  BP: (129-162)/(62-97) 150/70 Intake/Output:  11/10 0701 - 11/11 0700  In: 250 [P.O.:100]  Out: 902 [Urine:901]       Recent Labs     11/09/19  1053 11/09/19  1650 11/10/19  0610   WBC 20.29*  --  10.92   HGB 15.7  --  12.8   HCT 46.1  --  37.5     --  174   * 139 139   K 4.7 4.0 3.4*   CL 97 108 106   CO2 21* 22* 26   BUN 59* 38* 18   CREATININE 3.9* 1.7* 0.7   BILITOT 0.6  --  0.5   AST  140*  --  83*   *  --  84*   ALKPHOS 265*  --  164*   CALCIUM 9.5 7.9* 8.5*   ALBUMIN 3.8  --  3.0*   PROT 7.9  --  6.4   MG 2.4  --  1.5*   PHOS  --   --  1.7*   INR 1.1  --   --         Scheduled Meds:   cefTRIAXone (ROCEPHIN) IVPB  2 g Intravenous Q24H    escitalopram oxalate  10 mg Oral Daily    methadone  90 mg Oral Daily    metronidazole  500 mg Intravenous Q8H     Continuous Infusions:   lactated ringers 125 mL/hr at 11/11/19 0510     PRN Meds:acetaminophen, albuterol-ipratropium, morphine, morphine, ondansetron, promethazine (PHENERGAN) IVPB, sodium chloride 0.9%

## 2019-11-12 ENCOUNTER — ANESTHESIA EVENT (OUTPATIENT)
Dept: ENDOSCOPY | Facility: HOSPITAL | Age: 59
DRG: 391 | End: 2019-11-12
Payer: MEDICAID

## 2019-11-12 ENCOUNTER — ANESTHESIA (OUTPATIENT)
Dept: ENDOSCOPY | Facility: HOSPITAL | Age: 59
DRG: 391 | End: 2019-11-12
Payer: MEDICAID

## 2019-11-12 ENCOUNTER — TELEPHONE (OUTPATIENT)
Dept: GASTROENTEROLOGY | Facility: CLINIC | Age: 59
End: 2019-11-12

## 2019-11-12 VITALS
BODY MASS INDEX: 26.52 KG/M2 | SYSTOLIC BLOOD PRESSURE: 124 MMHG | TEMPERATURE: 98 F | OXYGEN SATURATION: 95 % | WEIGHT: 149.69 LBS | HEIGHT: 63 IN | HEART RATE: 94 BPM | RESPIRATION RATE: 18 BRPM | DIASTOLIC BLOOD PRESSURE: 67 MMHG

## 2019-11-12 PROBLEM — R57.9 SHOCK: Status: RESOLVED | Noted: 2019-11-09 | Resolved: 2019-11-12

## 2019-11-12 PROBLEM — D72.829 LEUKOCYTOSIS: Status: RESOLVED | Noted: 2019-11-09 | Resolved: 2019-11-12

## 2019-11-12 PROBLEM — E87.1 HYPONATREMIA: Status: RESOLVED | Noted: 2019-11-09 | Resolved: 2019-11-12

## 2019-11-12 PROBLEM — N17.0 ACUTE RENAL FAILURE WITH TUBULAR NECROSIS: Status: RESOLVED | Noted: 2019-11-09 | Resolved: 2019-11-12

## 2019-11-12 PROBLEM — R53.1 WEAKNESS: Status: RESOLVED | Noted: 2019-11-10 | Resolved: 2019-11-12

## 2019-11-12 PROCEDURE — S0030 INJECTION, METRONIDAZOLE: HCPCS | Performed by: HOSPITALIST

## 2019-11-12 PROCEDURE — 37000008 HC ANESTHESIA 1ST 15 MINUTES: Performed by: INTERNAL MEDICINE

## 2019-11-12 PROCEDURE — 63600175 PHARM REV CODE 636 W HCPCS: Performed by: NURSE ANESTHETIST, CERTIFIED REGISTERED

## 2019-11-12 PROCEDURE — 88305 TISSUE EXAM BY PATHOLOGIST: CPT | Performed by: PATHOLOGY

## 2019-11-12 PROCEDURE — 37000009 HC ANESTHESIA EA ADD 15 MINS: Performed by: INTERNAL MEDICINE

## 2019-11-12 PROCEDURE — 88305 TISSUE EXAM BY PATHOLOGIST: CPT | Mod: 26,,, | Performed by: PATHOLOGY

## 2019-11-12 PROCEDURE — 45380 COLONOSCOPY AND BIOPSY: CPT | Mod: ,,, | Performed by: INTERNAL MEDICINE

## 2019-11-12 PROCEDURE — 99900035 HC TECH TIME PER 15 MIN (STAT)

## 2019-11-12 PROCEDURE — 88342 CHG IMMUNOCYTOCHEMISTRY: ICD-10-PCS | Mod: 26,,, | Performed by: PATHOLOGY

## 2019-11-12 PROCEDURE — 88305 TISSUE EXAM BY PATHOLOGIST: ICD-10-PCS | Mod: 26,,, | Performed by: PATHOLOGY

## 2019-11-12 PROCEDURE — 25000003 PHARM REV CODE 250: Performed by: HOSPITALIST

## 2019-11-12 PROCEDURE — 27201012 HC FORCEPS, HOT/COLD, DISP: Performed by: INTERNAL MEDICINE

## 2019-11-12 PROCEDURE — 45380 COLONOSCOPY AND BIOPSY: CPT | Performed by: INTERNAL MEDICINE

## 2019-11-12 PROCEDURE — 45380 PR COLONOSCOPY,BIOPSY: ICD-10-PCS | Mod: ,,, | Performed by: INTERNAL MEDICINE

## 2019-11-12 PROCEDURE — S4991 NICOTINE PATCH NONLEGEND: HCPCS | Performed by: HOSPITALIST

## 2019-11-12 PROCEDURE — 88342 IMHCHEM/IMCYTCHM 1ST ANTB: CPT | Performed by: PATHOLOGY

## 2019-11-12 PROCEDURE — 94761 N-INVAS EAR/PLS OXIMETRY MLT: CPT

## 2019-11-12 PROCEDURE — 88342 IMHCHEM/IMCYTCHM 1ST ANTB: CPT | Mod: 26,,, | Performed by: PATHOLOGY

## 2019-11-12 RX ORDER — METRONIDAZOLE 500 MG/1
500 TABLET ORAL 2 TIMES DAILY
Qty: 10 TABLET | Refills: 0 | Status: SHIPPED | OUTPATIENT
Start: 2019-11-12 | End: 2019-11-17

## 2019-11-12 RX ORDER — SODIUM CHLORIDE 9 MG/ML
INJECTION, SOLUTION INTRAVENOUS CONTINUOUS PRN
Status: DISCONTINUED | OUTPATIENT
Start: 2019-11-12 | End: 2019-11-12

## 2019-11-12 RX ORDER — PROPOFOL 10 MG/ML
VIAL (ML) INTRAVENOUS
Status: DISCONTINUED | OUTPATIENT
Start: 2019-11-12 | End: 2019-11-12

## 2019-11-12 RX ORDER — PROPOFOL 10 MG/ML
VIAL (ML) INTRAVENOUS CONTINUOUS PRN
Status: DISCONTINUED | OUTPATIENT
Start: 2019-11-12 | End: 2019-11-12

## 2019-11-12 RX ORDER — LIDOCAINE HCL/PF 100 MG/5ML
SYRINGE (ML) INTRAVENOUS
Status: DISCONTINUED | OUTPATIENT
Start: 2019-11-12 | End: 2019-11-12

## 2019-11-12 RX ADMIN — METRONIDAZOLE 500 MG: 500 INJECTION, SOLUTION INTRAVENOUS at 08:11

## 2019-11-12 RX ADMIN — METRONIDAZOLE 500 MG: 500 INJECTION, SOLUTION INTRAVENOUS at 01:11

## 2019-11-12 RX ADMIN — PROPOFOL 150 MCG/KG/MIN: 10 INJECTION, EMULSION INTRAVENOUS at 09:11

## 2019-11-12 RX ADMIN — NICOTINE 1 PATCH: 7 PATCH, EXTENDED RELEASE TRANSDERMAL at 08:11

## 2019-11-12 RX ADMIN — PROPOFOL 100 MG: 10 INJECTION, EMULSION INTRAVENOUS at 09:11

## 2019-11-12 RX ADMIN — METHADONE HYDROCHLORIDE 90 MG: 10 TABLET ORAL at 08:11

## 2019-11-12 RX ADMIN — ESCITALOPRAM OXALATE 10 MG: 10 TABLET ORAL at 08:11

## 2019-11-12 RX ADMIN — LIDOCAINE HYDROCHLORIDE 100 MG: 20 INJECTION, SOLUTION INTRAVENOUS at 09:11

## 2019-11-12 RX ADMIN — SODIUM CHLORIDE: 0.9 INJECTION, SOLUTION INTRAVENOUS at 09:11

## 2019-11-12 NOTE — ASSESSMENT & PLAN NOTE
Hyponatremia - Resolved  Continue to monitor renal function.   Stop LR.   Creatinine is down to 0.6.

## 2019-11-12 NOTE — PLAN OF CARE
Discharge planning brochure provided. White board updated with CM name & contact info.  Pt encouraged to call with any questions or needs. CM will continue to follow patient throughout the transitions of care, and assist with any discharge needs.       11/11/19 1220   Discharge Assessment   Assessment Type Discharge Planning Assessment   Confirmed/corrected address and phone number on facesheet? Yes   Assessment information obtained from? Patient   Communicated expected length of stay with patient/caregiver yes   Prior to hospitilization cognitive status: Alert/Oriented   Prior to hospitalization functional status: Independent   Current cognitive status: Alert/Oriented   Current Functional Status: Independent   Lives With spouse   Able to Return to Prior Arrangements yes   Is patient able to care for self after discharge? Yes   Who are your caregiver(s) and their phone number(s)?   (spouse Armin [phone])   Readmission Within the Last 30 Days no previous admission in last 30 days   Patient currently being followed by outpatient case management? No   Patient currently receives any other outside agency services? No   Equipment Currently Used at Home none   Do you have any problems affording any of your prescribed medications? No   Is the patient taking medications as prescribed? yes   Does the patient have transportation home? Yes   Transportation Anticipated family or friend will provide   Does the patient receive services at the Coumadin Clinic? No   Discharge Plan A Home   Discharge Plan B Home with family   DME Needed Upon Discharge  none   Patient/Family in Agreement with Plan yes       Chantal Hoff RN    [phone]

## 2019-11-12 NOTE — TRANSFER OF CARE
"Anesthesia Transfer of Care Note    Patient: Yu Meza    Procedure(s) Performed: Procedure(s) (LRB):  COLONOSCOPY (N/A)    Patient location: GI    Anesthesia Type: MAC    Transport from OR: Transported from OR on room air with adequate spontaneous ventilation    Post pain: adequate analgesia    Post assessment: no apparent anesthetic complications    Post vital signs: stable    Level of consciousness: awake, alert and oriented    Nausea/Vomiting: no nausea/vomiting    Complications: none    Transfer of care protocol was followed      Last vitals:   Visit Vitals  BP (!) 144/78 (BP Location: Left arm, Patient Position: Lying)   Pulse 94   Temp 36.6 °C (97.9 °F) (Temporal)   Resp 20   Ht 5' 3" (1.6 m)   Wt 67.9 kg (149 lb 11.1 oz)   SpO2 95%   Breastfeeding? No   BMI 26.52 kg/m²     "

## 2019-11-12 NOTE — PROGRESS NOTES
[facility]/General Surgery  Progress Note    Admit Date: 11/9/2019  Hospital Day: 3  Procedure: Day of Surgery none    Subjective:  NAEO. AFVSS  Patient reports still having some abdominal pain, but is improved significantly since yesterday  No fevers overnight, had 1 formed bowel movement  Denies n/v  None f/c, cp/sob  Passing both flatus/BM  Patient is both ambulating/voiding spontaneously      Physical Exam:  Gen: no acute distress. Alert and oriented x3.  HEENT: normocephalic and atraumatic. EOMI.   Resp: unlabored respirations  CV: regular rate, distal pulses intact  Abd: soft, tender in the left lower quadrant to deep palpation, no rebound no guarding  Ext: warm and well perfused  MSK: strength grossly intact  Neuro: no focal deficits, normal sensation    Labs reviewed below- a.m. labs pending, C diff pending  I/O (last 24 hours):  UOP:  Recorded at 901      Assessment/Plan: 59 y.o. female admitted 11/9/2019 with leukocytosis lactic acidosis and hypotension complaining of abdominal pain nausea and vomiting  Diet-   clear liquids  Antibiotics- for primary  Prophylaxis- for primary    Dispo:     Stable  For colonoscopy  Will decide Plan after the colonoscopy care evaluation    Will continue to follow up    Ailyn Hernández MD   [facility] General Surgery, PGY2  11/12/2019       Inpatient Data     Vitals:   Temp:  [97.3 °F (36.3 °C)-98.2 °F (36.8 °C)] 97.3 °F (36.3 °C)  Pulse:  [] 100  Resp:  [18-20] 20  SpO2:  [93 %-100 %] 100 %  BP: (110-153)/(58-85) 134/69 Intake/Output:  11/11 0701 - 11/12 0700  In: 5075 [P.O.:875; I.V.:3750]  Out: 1525 [Urine:925]       Recent Labs     11/09/19  1650 11/10/19  0610 11/11/19  0852   WBC  --  10.92 11.65   HGB  --  12.8 11.9*   HCT  --  37.5 36.1*   PLT  --  174 173    139 138   K 4.0 3.4* 3.2*    106 102   CO2 22* 26 26   BUN 38* 18 9   CREATININE 1.7* 0.7 0.6   BILITOT  --  0.5 0.4   AST  --  83* 62*   ALT  --  84* 78*   ALKPHOS  --  164* 120    CALCIUM 7.9* 8.5* 8.9   ALBUMIN  --  3.0* 2.8*   PROT  --  6.4 6.5   MG  --  1.5* 1.2*   PHOS  --  1.7* 1.2*        Scheduled Meds:   escitalopram oxalate  10 mg Oral Daily    methadone  90 mg Oral Daily    metronidazole  500 mg Intravenous Q8H    nicotine  1 patch Transdermal Daily    polyethylene glycol  4,000 mL Oral Once     Continuous Infusions:    PRN Meds:acetaminophen, albuterol-ipratropium, morphine, morphine, ondansetron, promethazine (PHENERGAN) IVPB, sodium chloride 0.9%

## 2019-11-12 NOTE — NURSING
Cued into patient's room with permission. Patient in supine position with head of bed elevated 45 degrees. Denies pain or nausea. Plan of care reviewed. Fall precaution education done. Instructions on test in am. Patient verbalizes understanding on all instruction. Time allowed for questions and concerns. Will continue to be available as needed

## 2019-11-12 NOTE — ASSESSMENT & PLAN NOTE
Lactic acidosis - resolved  Leukocytosis - Resolved  Unsure of the etiology.   Possibly due to hypovolemia given the vomiting and diarrhea, and quick resolution with volume resuscitation.   Off norepinephrine since 15:42 11/9.    Stop LR today.   Repeat lactate are WNL.   Continue ceftriaxone and metronidazole empirically for now.

## 2019-11-12 NOTE — PLAN OF CARE
Patients pain well controlled today, only receiving daily methadone. Ambulated in hallway today. Fall precautions maintained. Contact isolation removed today. Patient did have three liquid bowel movements this evening and did vomit mag citrate that was taken oral. Patient denies nausea after vomiting. No medication given. NPO after midnight tonight for colonoscopy. Discussed plan of care with patient and spouse using verbal teachback method.

## 2019-11-12 NOTE — PLAN OF CARE
0110H Received report from Felicity CEBALLOS,RN.  Patient is in the bathroom. Care plan explained and verbalized understanding. On room air, no complaints of shortness of breath. On heart monitor running Sinus Rhythm to SinusTachycardia at 104bpm. IV site to the right hand 20G; flushed and saline locked. Maintained on clear liquid diet. Instructed on Npo after 5am for colonoscopy today. Maintained on fall precaution. Bed in lowest position, bed alarms on, call light within reach and instructed to call for help when needed. Will continue  to monitor.

## 2019-11-12 NOTE — PLAN OF CARE
D/C rounds complete. All questions answered.  Nurse to discuss d/c medications.  Discussed need to keep f/u appts, adherence to medication regimen for health maintenance, verbalized understanding.    Attempted to schedule f/u with Dr Tabor, informed office will contact pt for appt.  Attempted to schedule PCP appt, informed physician will contact pt for f/u, number updated and pt aware.    Pt has no home health or DME needs.  Spouse available for d/c.       11/12/19 1238   Final Note   Assessment Type Final Discharge Note   Anticipated Discharge Disposition Home   Hospital Follow Up  Appt(s) scheduled? Yes   Discharge plans and expectations educations in teach back method with documentation complete? Yes   Right Care Referral Info   Post Acute Recommendation No Care

## 2019-11-12 NOTE — SUBJECTIVE & OBJECTIVE
Interval History: continues to feel better. No abdominal pain or diarrhea or nausea. Agrees to get colonoscopy done now.     Review of Systems   Constitutional: Negative for chills and fever.   Respiratory: Negative for cough and shortness of breath.    Cardiovascular: Negative for chest pain and palpitations.   Gastrointestinal: Negative for nausea and vomiting.     Objective:     Vital Signs (24h Range):  Temp:  [97.9 °F (36.6 °C)-98.2 °F (36.8 °C)] 98 °F (36.7 °C)  Pulse:  [] 94  Resp:  [16-19] 18  SpO2:  [93 %-94 %] 93 %  BP: (124-153)/(58-85) 133/65     Weight: 67.9 kg (149 lb 11.1 oz)  Body mass index is 26.52 kg/m².    Intake/Output Summary (Last 24 hours) at 11/12/2019 0553  Last data filed at 11/12/2019 0129  Gross per 24 hour   Intake 5075 ml   Output 1525 ml   Net 3550 ml      Physical Exam   Constitutional: She is oriented to person, place, and time. She appears well-developed and well-nourished. No distress.   Cardiovascular: Normal rate and regular rhythm.   Murmur heard.  Pulmonary/Chest: Effort normal and breath sounds normal. No respiratory distress. She has no wheezes.   Abdominal: Soft. Bowel sounds are normal. She exhibits no distension. There is generalized tenderness.   Musculoskeletal: She exhibits no edema.   Neurological: She is alert and oriented to person, place, and time.   Skin: Skin is warm and dry.   Psychiatric: She has a normal mood and affect. Her behavior is normal.   Nursing note and vitals reviewed.      Significant Labs:   CBC:   Recent Labs   Lab 11/10/19  0610 11/11/19  0852   WBC 10.92 11.65   HGB 12.8 11.9*   HCT 37.5 36.1*    173     CMP:   Recent Labs   Lab 11/10/19  0610 11/11/19  0852    138   K 3.4* 3.2*    102   CO2 26 26   * 95   BUN 18 9   CREATININE 0.7 0.6   CALCIUM 8.5* 8.9   PROT 6.4 6.5   ALBUMIN 3.0* 2.8*   BILITOT 0.5 0.4   ALKPHOS 164* 120   AST 83* 62*   ALT 84* 78*   ANIONGAP 7* 10   EGFRNONAA >60 >60     Magnesium:   Recent  Labs   Lab 11/10/19  0610 11/11/19  0852   MG 1.5* 1.2*       Significant Imaging: I have reviewed all pertinent imaging results/findings within the past 24 hours.

## 2019-11-12 NOTE — PROGRESS NOTES
[facility] Hospital Medicine  Progress Note    Patient Name: Yu Meza  MRN: [id number]  Patient Class: IP- Inpatient   Admission Date: 11/9/2019  Length of Stay: 3 days  Attending Physician: Gatito Vyas MD  Primary Care Provider: Keisha Silva NP        Subjective:     Principal Problem:Shock        HPI:  Ms. Meza is a 58 yo female with HTN and methadone dependence that presented to [facility] ED for evaluation of nausea, vomiting, and diarrhea over the last day. She and her  report her having a few episodes of emesis and 4 large, loose bowel movements over the last day. She has some nausea still and reports some dizziness. She says that she has been urinating normally. She denies any recent sick contacts and her  is not having any of the same symptoms. She denies any abdominal pain, dysuria, fever, or cough. She reports mild SOB currently. Her blood pressure was in the 70s systolic upon arrival to the ED and did not respond sufficiently to IV fluids, so she was started on norepinephrine.     Overview/Hospital Course:  No notes on file    Interval History: continues to feel better. No abdominal pain or diarrhea or nausea. Agrees to get colonoscopy done now.     Review of Systems   Constitutional: Negative for chills and fever.   Respiratory: Negative for cough and shortness of breath.    Cardiovascular: Negative for chest pain and palpitations.   Gastrointestinal: Negative for nausea and vomiting.     Objective:     Vital Signs (24h Range):  Temp:  [97.9 °F (36.6 °C)-98.2 °F (36.8 °C)] 98 °F (36.7 °C)  Pulse:  [] 94  Resp:  [16-19] 18  SpO2:  [93 %-94 %] 93 %  BP: (124-153)/(58-85) 133/65     Weight: 67.9 kg (149 lb 11.1 oz)  Body mass index is 26.52 kg/m².    Intake/Output Summary (Last 24 hours) at 11/12/2019 0553  Last data filed at 11/12/2019 0129  Gross per 24 hour   Intake 5075 ml   Output 1525 ml   Net 3550 ml      Physical Exam   Constitutional: She is  oriented to person, place, and time. She appears well-developed and well-nourished. No distress.   Cardiovascular: Normal rate and regular rhythm.   Murmur heard.  Pulmonary/Chest: Effort normal and breath sounds normal. No respiratory distress. She has no wheezes.   Abdominal: Soft. Bowel sounds are normal. She exhibits no distension. There is generalized tenderness.   Musculoskeletal: She exhibits no edema.   Neurological: She is alert and oriented to person, place, and time.   Skin: Skin is warm and dry.   Psychiatric: She has a normal mood and affect. Her behavior is normal.   Nursing note and vitals reviewed.      Significant Labs:   CBC:   Recent Labs   Lab 11/10/19  0610 11/11/19  0852   WBC 10.92 11.65   HGB 12.8 11.9*   HCT 37.5 36.1*    173     CMP:   Recent Labs   Lab 11/10/19  0610 11/11/19  0852    138   K 3.4* 3.2*    102   CO2 26 26   * 95   BUN 18 9   CREATININE 0.7 0.6   CALCIUM 8.5* 8.9   PROT 6.4 6.5   ALBUMIN 3.0* 2.8*   BILITOT 0.5 0.4   ALKPHOS 164* 120   AST 83* 62*   ALT 84* 78*   ANIONGAP 7* 10   EGFRNONAA >60 >60     Magnesium:   Recent Labs   Lab 11/10/19  0610 11/11/19  0852   MG 1.5* 1.2*       Significant Imaging: I have reviewed all pertinent imaging results/findings within the past 24 hours.      Assessment/Plan:      * Shock  Lactic acidosis - resolved  Leukocytosis - Resolved  Unsure of the etiology.   Possibly due to hypovolemia given the vomiting and diarrhea, and quick resolution with volume resuscitation.   Off norepinephrine since 15:42 11/9.    Stop LR today.   Repeat lactate are WNL.   Continue ceftriaxone and metronidazole empirically for now.    Acute renal failure with tubular necrosis  Hyponatremia - Resolved  Continue to monitor renal function.   Stop LR.   Creatinine is down to 0.6.      Ileitis  Colitis  N/V/Diarrhea  Presumed infectious but possibly inflammatory or ischemic  General surgery and GI consulted.   Appreciate their assistance.    Agreed to colonoscopy, so notified GI to schedule for tomorrow.     Hypokalemia  Give KCl.       Hypomagnesemia  Give mag sulfate IV.       Hypophosphatemia  Give neutra phos.       Abnormal liver enzymes  A little higher than prior.   RUQ US fairly unremarkable.   Trend and decreasing currently.       Major depressive disorder  Continue lexapro.       Essential hypertension, benign  Hold home lisinopril 10 mg daily with the low blood pressure and YADIRA.   SBP ranged 129 to 162.    Methadone dependence  Continue home methadone 90 mg daily.         VTE Risk Mitigation (From admission, onward)         Ordered     IP VTE LOW RISK PATIENT  Once      11/09/19 1456     Place DRAKE hose  Until discontinued      11/09/19 1456     Place sequential compression device  Until discontinued      11/09/19 1456                      Gatito Vyas MD  Department of Hospital Medicine   [facility]

## 2019-11-12 NOTE — ANESTHESIA PREPROCEDURE EVALUATION
11/12/2019  Yu Meza is a 59 y.o., female admitted w N/V & diarrhea; for colonoscopy.    PRIOR ANES (in Epic)   none     ANES-RELATED MED/SURG  Patient Active Problem List   Diagnosis    Hypertension    S/P laparoscopic cholecystectomy    Wound infection    Shock    Methadone dependence    Essential hypertension, benign    Major depressive disorder    Abnormal liver enzymes    Diarrhea of presumed infectious origin    Weakness    Hypophosphatemia    Hypomagnesemia    Hypokalemia    Ileitis     Past Medical History:   Diagnosis Date    Hypertension     Methadone dependence      Past Surgical History:   Procedure Laterality Date    CHOLECYSTECTOMY  5/18/14     Laparoscopic Cholecystectomy 5/18/14 [place]    MASS EXCISION Right 1998    Thigh fatty cyst     ALLERGIES  Review of patient's allergies indicates:  No Known Allergies    ANES-RELATED MAR [id number]  methadone    Anesthesia Evaluation         Review of Systems    Wt Readings from Last 1 Encounters:   11/12/19 67.9 kg (149 lb 11.1 oz)     Temp Readings from Last 1 Encounters:   11/12/19 36.7 °C (98.1 °F) (Oral)     BP Readings from Last 1 Encounters:   11/12/19 (!) 140/68     Pulse Readings from Last 1 Encounters:   11/12/19 91     SpO2 Readings from Last 1 Encounters:   11/12/19 97%         Lab Results   Component Value Date    WBC 11.65 11/11/2019    HGB 11.9 (L) 11/11/2019    HCT 36.1 (L) 11/11/2019    MCV 86 11/11/2019     11/11/2019        Chemistry        Component Value Date/Time     11/11/2019 0852    K 3.2 (L) 11/11/2019 0852     11/11/2019 0852    CO2 26 11/11/2019 0852    BUN 9 11/11/2019 0852    CREATININE 0.6 11/11/2019 0852    GLU 95 11/11/2019 0852        Component Value Date/Time    CALCIUM 8.9 11/11/2019 0852    ALKPHOS 120 11/11/2019 0852    AST 62 (H) 11/11/2019 0852    ALT 78 (H)  11/11/2019 0852    BILITOT 0.4 11/11/2019 0852    ESTGFRAFRICA >60 11/11/2019 0852    EGFRNONAA >60 11/11/2019 0852            Lab Results   Component Value Date    ALBUMIN 2.8 (L) 11/11/2019      Lab Results   Component Value Date    TSH 0.708 11/09/2019      Lab Results   Component Value Date    APTT 29.4 11/09/2019      Lab Results   Component Value Date    INR 1.1 11/09/2019       CXR    EKG 20191009  Normal sinus rhythm  Biatrial enlargement  Abnormal ECG  When compared with ECG of 30-JUL-2015 14:44,  No significant change was found  Confirmed    ECHO    Anesthesia Plan  Type of Anesthesia, risks & benefits discussed:  Anesthesia Type:  MAC  Patient's Preference:   Intra-op Monitoring Plan:   Intra-op Monitoring Plan Comments:   Post Op Pain Control Plan:   Post Op Pain Control Plan Comments:   Induction:    Beta Blocker:  Patient is not currently on a Beta-Blocker (No further documentation required).       Informed Consent: Patient understands risks and agrees with Anesthesia plan.  Questions answered. Anesthesia consent signed with patient.  ASA Score: 2     Day of Surgery Review of History & Physical:            Ready For Surgery From Anesthesia Perspective.

## 2019-11-12 NOTE — HOSPITAL COURSE
CT showed circumferential thickening involving the distal ileum and terminal ileum and circumferential thickening and inflammatory change involving the distal transverse colon extending to the rectosigmoid colon. Stool was negative for C. difficile. Symptoms improved. Colonoscopy was done on 11/12/19. Inadequate colon preparation precluded an ileal evaluation, but there was a continuous rectosigmoid mucosal ulceration that was biopsied. Gastroenterology recommended considering budesonide for possible inflammatory bowel disease but will await pathology results. She was discharged home with prescription for metronidazole for 5 more days and will follow up in Gastroenterology clinic.  
Oriented - self; Oriented - place; Oriented - time

## 2019-11-12 NOTE — TELEPHONE ENCOUNTER
----- Message from Chantal Hoff RN sent at 11/12/2019 12:45 PM CST -----  Contact: PERLITA Cornejo  Pt will need f/u appt with dR Tabor for hospital d/c.    Chantal Hoff RN    [phone]

## 2019-11-12 NOTE — DISCHARGE SUMMARY
[facility] Hospital Medicine  Discharge Summary      Patient Name: Yu Meza  MRN: [id number]  Admission Date: 11/9/2019  Hospital Length of Stay: 3 days  Discharge Date and Time: 11/12/2019  3:33 PM  Attending Physician: Alberto Kim MD   Discharging Provider: Alberto Kim MD  Primary Care Provider: Keisha Silva NP      HPI:   Yu Meza is a 59 y.o. white woman with hypertension, depression, methadone dependence, history of laparoscopic cholecystectomy. She lives in [place], [place]. She is . Her primary care nurse practitioner is Keisha Silva.   She presented to [facility] Emergency Department on 11/9/19 with nausea, vomiting, and diarrhea for the past day. She and her  reported that she had a few episodes of emesis and 4 large, loose bowel movements. She had dizziness. Her  did not have similar symptoms. Her systolic blood pressure was in the 70s upon arrival to the emergency department and did not respond sufficiently to IV fluids, so she was started on norepinephrine. She was admitted to [facility].    Procedure(s) (LRB):  COLONOSCOPY (N/A)  11/12/19:  Findings:       A continuous area of nonbleeding ulcerated mucosa with no stigmata        of recent bleeding was present in the recto-sigmoid colon, in the        sigmoid colon and in the descending colon. Biopsies were taken with        a cold forceps for histology. Verification of patient identification        for the specimen was done by the physician. Estimated blood loss:        none.  Impression:           - Preparation of the colon was inadequate.                        - Mucosal ulceration. Biopsied.  Recommendation:       - [facility] to [facility] for [facility] care.                        - Advance diet as tolerated.                        - Continue present medications.                        - Await pathology results.                         - Unclear if severe crohns vs ischemic damage;                         noted to have suspected ileal inflammation on                         imaging as well, but stool contents precluded ileal                         evaluation today                        - Consider trial of entocort, f/u pathology                        - Repeat colonoscopy after studies are complete for                         surveillance.    Hospital Course:   CT showed circumferential thickening involving the distal ileum and terminal ileum and circumferential thickening and inflammatory change involving the distal transverse colon extending to the rectosigmoid colon. Stool was negative for C. difficile. Symptoms improved. Colonoscopy was done on 11/12/19. Inadequate colon preparation precluded an ileal evaluation, but there was a continuous rectosigmoid mucosal ulceration that was biopsied. Gastroenterology recommended considering budesonide for possible inflammatory bowel disease but will await pathology results. She was discharged home with prescription for metronidazole for 5 more days and will follow up in Gastroenterology clinic.     Consults:   Consults (From admission, onward)        Status Ordering Provider     Inpatient consult to Gastroenterology  Once     Provider:  (Not yet assigned)    Completed SERGIO GONZALEZ     Inpatient consult to General Surgery  Once     Provider:  (Not yet assigned)    Completed DAVID MILLER        Final Active Diagnoses:    Diagnosis Date Noted POA    Hypophosphatemia [E83.39] 11/10/2019 Yes    Hypomagnesemia [E83.42] 11/10/2019 Yes    Hypokalemia [E87.6] 11/10/2019 Yes    Ileitis [K52.9] 11/10/2019 Yes    Methadone dependence [F11.20] 11/09/2019 Yes     Chronic    Essential hypertension, benign [I10] 11/09/2019 Yes     Chronic    Major depressive disorder [F32.9] 11/09/2019 Yes     Chronic    Abnormal liver enzymes [R74.8] 11/09/2019 Yes    Diarrhea of presumed infectious origin  [R19.7] 11/09/2019 Yes    S/P laparoscopic cholecystectomy [Z90.49] 05/27/2014 Not Applicable     Chronic      Problems Resolved During this Admission:    Diagnosis Date Noted Date Resolved POA    PRINCIPAL PROBLEM:  Shock [R57.9] 11/09/2019 11/12/2019 Yes    Weakness [R53.1] 11/10/2019 11/12/2019 Yes    Acute renal failure with tubular necrosis [N17.0] 11/09/2019 11/12/2019 Yes    Leukocytosis [D72.829] 11/09/2019 11/12/2019 Yes    Hyponatremia [E87.1] 11/09/2019 11/12/2019 Yes       Discharged Condition: good    Disposition: Home or Self Care    Follow Up:  Follow-up Information     Gila Tabor MD In 1 week.    Specialty:  Gastroenterology  Contact information:  [address]  [address]  [place] [address]  [phone]                 Patient Instructions:      Hepatitis panel, acute   Standing Status: Future Standing Exp. Date: 01/07/21     HIV 1/2 Ag/Ab (4th Gen)   Standing Status: Future Standing Exp. Date: 01/07/21     RPR   Standing Status: Future Standing Exp. Date: 01/07/21     Ambulatory referral to Gastroenterology   Referral Priority: Routine Referral Type: Consultation   Referral Reason: Specialty Services Required   Requested Specialty: Gastroenterology   Number of Visits Requested: 1     Diet Adult Regular     Notify your health care provider if you experience any of the following:  persistent nausea and vomiting or diarrhea     Activity as tolerated       Significant Diagnostic Studies:   CT Abdomen Pelvis Without Contrast 11/10/19: FINDINGS:  The lung bases demonstrate mild atelectatic change.  The stomach demonstrates nonspecific appearance of mild fluid and air within the gastric lumen.  The gallbladder is surgically absent.  Mild irregular contour of the liver may relate to chronic hepatic disease, clinical and historical correlation is needed.  There is mild free fluid of the abdomen and pelvis.  The gallbladder is surgically absent.  When accounting for limitations of the  examination there is no evidence for acute process of the pancreas, spleen or adrenal glands.  There is no evidence for hydronephrosis or obstructive uropathy bilaterally.  The abdominal aorta appears normal in caliber.  Atherosclerotic change is noted.  Worrell catheter is noted within the urinary bladder air within the urinary bladder may relate to the Worrell catheter although can be seen with infection clinical correlation is needed.  Evaluation of the uterus and adnexa is limited however there is no dominant adnexal mass or cystic collection seen.  There are prominent small bowel loops of the abdomen and pelvis noted, this may relate to ileus or partial obstruction, note is made of circumferential thickening involving the distal small bowel, involving the distal ileum and terminal ileum, correlation for inflammatory bowel disease is needed.  Complete small bowel obstruction is not thought present at this time.  The appendix is identified and does not appear inflamed.  There is mild air and stool noted along the colon, there is circumferential thickening involving the distal transverse colon, splenic flexure, more prominently along the descending colon as well as rectosigmoid colon, this may relate to colitis, may relate to inflammatory bowel disease, clinical and historical correlation is needed.  There is no colonic obstructive change.  There is no evidence for portal venous air or mesenteric venous air and there is no free intraperitoneal air.  The osseous structures demonstrate chronic change.  Impression:  There is circumferential thickening involving the distal ileum and terminal ileum, this may relate to ileitis, correlation for inflammatory bowel disease or Crohn's disease is needed, note is also made of dilated small bowel loops of the abdomen and pelvis that may relate to superimposed partial obstructive change.  Circumferential thickening and inflammatory change involving the distal transverse colon  extending to the rectosigmoid colon, correlation for colitis is needed, this also may relate to inflammatory bowel disease or Crohn's disease.  Mild free fluid of the abdomen and pelvis noted.  Irregular contour of the liver correlation for chronic hepatic disease is needed.  Mild air within the urinary bladder may relate to Worrell catheter although can be seen with infection, clinical and historical correlation is needed.    Pending Diagnostic Studies:     Procedure Component Value Units Date/Time    Specimen to Pathology, Surgery Gastrointestinal tract [[id number]] Collected:  11/12/19 0941    Order Status:  Sent Lab Status:  In process Updated:  11/12/19 0958         Medications:  Reconciled Home Medications:      Medication List      START taking these medications    metroNIDAZOLE 500 MG tablet  Commonly known as:  FLAGYL  Take 1 tablet (500 mg total) by mouth 2 (two) times daily. for 5 days        CONTINUE taking these medications    diclofenac 75 MG EC tablet  Commonly known as:  VOLTAREN  Take 1 tablet (75 mg total) by mouth 2 (two) times daily as needed.     escitalopram oxalate 10 MG tablet  Commonly known as:  LEXAPRO  Take 10 mg by mouth once daily.     estradiol 10 mcg Tab  Commonly known as:  VAGIFEM  Place vaginally.     lisinopril 10 MG tablet  Take 10 mg by mouth once daily.     methadone 5 MG tablet  Commonly known as:  DOLOPHINE  Take 90 mg by mouth once daily.        STOP taking these medications    methylPREDNISolone 4 mg tablet  Commonly known as:  MEDROL DOSEPACK            Indwelling Lines/Drains at time of discharge: None    Time spent on the discharge of patient: 35 minutes  Patient was seen and examined on the date of discharge and determined to be suitable for discharge.         Alberto Kim MD  Department of Hospital Medicine  [facility]

## 2019-11-12 NOTE — NURSING
VN cued into the patient's room with permission. I have reviewed the AVS in detail with the patient and her significant other. The patient was informed about her diagnosis and when to follow up with her physician. Her medications and education were thoroughly explained. All questions and concerns were addressed.

## 2019-11-12 NOTE — PLAN OF CARE
Recovery complete. Patient recovered to baseline. Transfer instructions reviewed with patient. VSS. Report phoned to PERLITA Laura. Transferred back to unit.

## 2019-11-12 NOTE — ANESTHESIA POSTPROCEDURE EVALUATION
Anesthesia Post Evaluation    Patient: Yu Mzea    Procedure(s) Performed: Procedure(s) (LRB):  COLONOSCOPY (N/A)    Final Anesthesia Type: MAC  Patient location during evaluation: GI PACU  Patient participation: Yes- Able to Participate  Level of consciousness: awake and alert and oriented  Post-procedure vital signs: reviewed and stable  Pain management: adequate  Airway patency: patent  PONV status at discharge: No PONV  Anesthetic complications: no      Cardiovascular status: blood pressure returned to baseline  Respiratory status: unassisted, room air and spontaneous ventilation  Hydration status: euvolemic  Follow-up not needed.          Vitals Value Taken Time   /78 11/12/2019  9:15 AM   Temp 36.6 °C (97.9 °F) 11/12/2019  9:15 AM   Pulse 94 11/12/2019  9:15 AM   Resp 20 11/12/2019  9:15 AM   SpO2 95 % 11/12/2019  9:15 AM         No case tracking events are documented in the log.      Pain/Satinder Score: Pain Rating Prior to Med Admin: 0 (11/12/2019  8:50 AM)  Pain Rating Post Med Admin: 0 (11/11/2019  1:01 AM)

## 2019-11-12 NOTE — [employer] PATIENT INSTRUCTIONS
Discharge Summary/Instructions after an Endoscopic Procedure  Patient Name: Yu Meza  Patient MRN: [id number]  Patient YOB: 1960 Tuesday, November 12, 2019  Gila Tabor MD  RESTRICTIONS:  During your procedure today, you received medications for sedation.  These   medications may affect your judgment, balance and coordination.  Therefore,   for 24 hours, you have the following restrictions:   - DO NOT drive a car, operate machinery, make legal/financial decisions,   sign important papers or drink alcohol.    ACTIVITY:  Today: no heavy lifting, straining or running due to procedural   sedation/anesthesia.  The following day: return to full activity including work.  DIET:  Eat and drink normally unless instructed otherwise.     TREATMENT FOR COMMON SIDE EFFECTS:  - Mild abdominal pain, nausea, belching, bloating or excessive gas:  rest,   eat lightly and use a heating pad.  - Sore Throat: treat with throat lozenges and/or gargle with warm salt   water.  - Because air was used during the procedure, expelling large amounts of air   from your rectum or belching is normal.  - If a bowel prep was taken, you may not have a bowel movement for 1-3 days.    This is normal.  SYMPTOMS TO WATCH FOR AND REPORT TO YOUR PHYSICIAN:  1. Abdominal pain or bloating, other than gas cramps.  2. Chest pain.  3. Back pain.  4. Signs of infection such as: chills or fever occurring within 24 hours   after the procedure.  5. Rectal bleeding, which would show as bright red, maroon, or black stools.   (A tablespoon of blood from the rectum is not serious, especially if   hemorrhoids are present.)  6. Vomiting.  7. Weakness or dizziness.  GO DIRECTLY TO THE NEAREST EMERGENCY ROOM IF YOU HAVE ANY OF THE FOLLOWING:      Difficulty breathing              Chills and/or fever over 101 F   Persistent vomiting and/or vomiting blood   Severe abdominal pain   Severe chest pain   Black, tarry stools   Bleeding- more than one  tablespoon   Any other symptom or condition that you feel may need urgent attention  Your doctor recommends these additional instructions:  If any biopsies were taken, your doctors clinic will contact you in 1 to 2   weeks with any results.  - [facility] to [facility] for [facility].   - Advance diet as tolerated.   - Continue present medications.   - Await pathology results.   - Unclear if severe crohns vs ischemic damage; noted to have suspected ileal   inflammation on imaging as well, but stool contents precluded ileal   evaluation today  - Consider trial of entocort, f/u pathology  - Repeat colonoscopy after studies are complete for surveillance.  For questions, problems or results please call your physician - Gila Tabor MD at Work:  [phone].  EMERGENCY PHONE NUMBER: [phone],  LAB RESULTS: [phone]  IF A COMPLICATION OR EMERGENCY SITUATION ARISES AND YOU ARE UNABLE TO REACH   YOUR PHYSICIAN - GO DIRECTLY TO THE EMERGENCY ROOM.  Gila Tabor MD  11/12/2019 9:53:05 AM  This report has been verified and signed electronically.  [employer]

## 2019-11-12 NOTE — ASSESSMENT & PLAN NOTE
Colitis  N/V/Diarrhea  Presumed infectious but possibly inflammatory or ischemic  General surgery and GI consulted.   Appreciate their assistance.   Agreed to colonoscopy, so notified GI to schedule for tomorrow.

## 2019-11-14 LAB
BACTERIA BLD CULT: NORMAL
BACTERIA BLD CULT: NORMAL
FINAL PATHOLOGIC DIAGNOSIS: NORMAL
GROSS: NORMAL
Lab: NORMAL

## 2019-11-21 ENCOUNTER — TELEPHONE (OUTPATIENT)
Dept: GASTROENTEROLOGY | Facility: CLINIC | Age: 59
End: 2019-11-21

## 2019-11-21 NOTE — TELEPHONE ENCOUNTER
Spoke with patient informing her that her results for her Colonoscopy has not come in as of yet, and once her results come in someone from the office will give her a call.

## 2019-11-21 NOTE — TELEPHONE ENCOUNTER
----- Message from Moni Garcia sent at 11/21/2019  8:46 AM CST -----  No. [phone]    Please call patient with colonoscopy biopsy results.

## 2019-11-25 ENCOUNTER — TELEPHONE (OUTPATIENT)
Dept: GASTROENTEROLOGY | Facility: CLINIC | Age: 59
End: 2019-11-25

## 2019-11-25 NOTE — TELEPHONE ENCOUNTER
----- Message from Akila Gillespie sent at 11/25/2019 11:55 AM CST -----  Contact: Ms Meza   [phone]  Type:  Test Results    Who Called: Mr Meza states patient has frequent bowel movement with stomach cramping  Name of Test (Lab/Mammo/Etc): Biopsy   Date of Test:   Ordering Provider:Dr Tabor   Where the test was performed:   Would the patient rather a call back or a response via [facility]?call  Best Call Back Number: [phone]  Additional Information:  Mr Meza states patient out of medication.   Please call Mr Meza for more info

## 2019-11-25 NOTE — PHYSICIAN QUERY
PT Name: Yu Meza  MR #: [id number]    Physician Query Form - Perfusion Diagnosis Clarification     CDS/: Kaylah James               Contact information: [email or web address]    This form is a permanent document in the medical record.     Query Date: November 25, 2019    By submitting this query, we are merely seeking further clarification of documentation. Please utilize your independent clinical judgment when addressing the question(s) below.    The medical record contains the following:    Indicators   Supporting Clinical Findings   Location in Medical Record   x Acute Illness (e.g. AMI, Sepsis, etc.) . Shock     Shock  Lactic acidosis  Leukocytosis  Diarrhea/Nausea/Vomiting  Unsure of the etiology.   Possibly due to hypovolemia given the vomiting and diarrhea, but did not sound that profuse.   Possibly septic with the diarrhea  Given 3 liters of saline in the ED and BP remained low, so started on norepinephrine.   Continue norepinephrine to maintain MAP > 65.   Continue LR at 125 mL/hr.   Repeat lactate.   Given ceftriaxone and doxycycline in the ED. Will continue ceftriaxone and start on metronidazole.     Shock  Lactic acidosis - resolved  Leukocytosis - Resolved  Unsure of the etiology.   Possibly due to hypovolemia given the vomiting and diarrhea, and quick resolution with volume resuscitation.   Off norepinephrine since 1542 11/9.    Continue LR but decrease rate.    Repeat lactate are WNL.   Continue ceftriaxone and metronidazole empirically for now.   ED provider note    H&P                                      HM note 11/10    Acidosis documented     x ABGs / Labs Lab 11/09/19 1053 11/09/19 1650 11/10/19 0610  LACTATE 2.6*      1.1                1.6     HM note 11/10   x Vital Signs Vital Signs (24h Range):  Temp:  [96.3 °F (35.7 °C)-98.1 °F (36.7 °C)] 97.4 °F (36.3 °C)  Pulse:  [] 106  Resp:  [18-34] 20  SpO2:  [93 %-100 %] 93 %  BP: ()/(36-95) 106/60 H&P   x Hypotension or Low  Blood Pressure documented Her blood pressure was in the 70s systolic upon arrival to the ED and did not respond sufficiently to IV fluids, so she was started on norepinephrine.  H&P   x Altered Mental Status or Confusion Pt is a 60 yo female w/h/o HTN, who presents for N/V/D since last night with altered mental status. Patient reports that last night she had 3 episodes of diarrhea and one episode of vomiting this morning ED provider note    Diaphoresis, Cold Extremities or Cyanosis      Oliguria     x Medication/Treatment:  -Vasopressors  -Inotropic Drugs  -IV Fluids  -Cardiac Assist Devices  -Hemodynamic Monitoring  -Blood/Blood Products sodium chloride 0.9% bolus 1,000 mL   Dose: 1,000 mL  Freq: ED 1 Time Route: IV  Start: 11/09/19 1200 End: 11/09/19 1352    sodium chloride 0.9% bolus 1,000 mL   Dose: 1,000 mL  Freq: ED 1 Time Route: IV  Start: 11/09/19 1145 End: 11/09/19 1246    sodium chloride 0.9% bolus 1,000 mL   Dose: 1,000 mL  Freq: ED 1 Time Route: IV  Start: 11/09/19 1100 End: 11/09/19 1146    sodium chloride 0.9% bolus 1,000 mL   Dose: 1,000 mL  Freq: ED 1 Time Route: IV  Start: 11/09/19 1045 End: 11/09/19 1131    norepinephrine bitartrate-D5W 16 mg/250 mL (64 mcg/mL) infusion   Rate: 3.1 mL/hr Dose: 0.05 mcg/kg/min  Weight Dosing Info: 65.8 kg  Freq: Continuous Route: IV  Start: 11/09/19 1300 End: 11/10/19 0947 MAR           MAR           MAR           MAR           MAR     Other:       Provider, please specify diagnosis or diagnoses associated with above clinical findings.    [ x  ] Hypovolemic Shock   [   ] Other Shock (please specify):   [   ] Shock Unspecified   [   ] Other Condition (please specify):   [  ] Clinically Undetermined         Please document in your progress notes daily for the duration of treatment until resolved and include in your discharge summary.

## 2019-11-27 ENCOUNTER — TELEPHONE (OUTPATIENT)
Dept: GASTROENTEROLOGY | Facility: CLINIC | Age: 59
End: 2019-11-27

## 2019-11-27 NOTE — TELEPHONE ENCOUNTER
----- Message from Felicity Paula sent at 11/27/2019 10:59 AM CST -----  Contact: [phone]/self   Patient is requesting to speak with office regarding colonoscopy results and scheduling a f/u appt. Please call and advise.

## 2019-12-09 ENCOUNTER — OFFICE VISIT (OUTPATIENT)
Dept: GASTROENTEROLOGY | Facility: CLINIC | Age: 59
End: 2019-12-09
Payer: MEDICAID

## 2019-12-09 VITALS — BODY MASS INDEX: 25.38 KG/M2 | WEIGHT: 143.31 LBS

## 2019-12-09 DIAGNOSIS — R93.5 ABNORMAL CT OF THE ABDOMEN: Primary | ICD-10-CM

## 2019-12-09 DIAGNOSIS — Z12.11 SCREENING FOR MALIGNANT NEOPLASM OF COLON: ICD-10-CM

## 2019-12-09 PROCEDURE — 99214 OFFICE O/P EST MOD 30 MIN: CPT | Mod: S$PBB,,, | Performed by: INTERNAL MEDICINE

## 2019-12-09 PROCEDURE — 99213 OFFICE O/P EST LOW 20 MIN: CPT | Mod: PBBFAC,PO | Performed by: INTERNAL MEDICINE

## 2019-12-09 PROCEDURE — 99999 PR PBB SHADOW E&M-EST. PATIENT-LVL III: CPT | Mod: PBBFAC,,, | Performed by: INTERNAL MEDICINE

## 2019-12-09 PROCEDURE — 99214 PR OFFICE/OUTPT VISIT, EST, LEVL IV, 30-39 MIN: ICD-10-PCS | Mod: S$PBB,,, | Performed by: INTERNAL MEDICINE

## 2019-12-09 PROCEDURE — 99999 PR PBB SHADOW E&M-EST. PATIENT-LVL III: ICD-10-PCS | Mod: PBBFAC,,, | Performed by: INTERNAL MEDICINE

## 2019-12-09 RX ORDER — SODIUM, POTASSIUM,MAG SULFATES 17.5-3.13G
1 SOLUTION, RECONSTITUTED, ORAL ORAL DAILY
Qty: 1 KIT | Refills: 0 | Status: SHIPPED | OUTPATIENT
Start: 2019-12-09 | End: 2019-12-11

## 2019-12-09 NOTE — PROGRESS NOTES
Subjective:       Patient ID: Yu Meza is a 59 y.o. female.    Chief Complaint: Follow-up    This is a 59-year-old female here for a follow-up visit.  She presented to the hospital with significant diarrhea and abnormal CT scan.  She underwent colonoscopy, pathology reviewed with them noting severe ulceration in the colon.  She was treated with antibiotics and was noting significant improvement.  Her diarrhea has resolved and she now notes normal bowel movement.  Occasional she still has some abdominal pain. No other exacerbating or relieving factors or other associated symptoms.  Family is present to give additional history.    The following portions of the patient's history were reviewed and updated as appropriate: allergies, current medications, past family history, past medical history, past social history, past surgical history and problem list.    (Portions of this note were dictated using voice recognition software and may contain dictation related errors in spelling/grammar/syntax not found on text review)  HPI  Review of Systems   Constitutional: Negative for diaphoresis and unexpected weight change.   Respiratory: Negative for shortness of breath and wheezing.    Cardiovascular: Negative for chest pain and palpitations.   Gastrointestinal: Negative for anal bleeding and blood in stool.   Musculoskeletal: Positive for arthralgias and back pain.         Objective:      Physical Exam   Constitutional: She is oriented to person, place, and time. She appears well-developed and well-nourished. No distress.   HENT:   Head: Normocephalic and atraumatic.   Eyes: Conjunctivae are normal. No scleral icterus.   Neck: Normal range of motion. Neck supple. No tracheal deviation present. No thyromegaly present.   Cardiovascular: Normal rate and regular rhythm. Exam reveals no gallop and no friction rub.   Pulmonary/Chest: Effort normal. No respiratory distress. She has no wheezes.   Abdominal: Soft. Bowel sounds  are normal. She exhibits no distension. There is no tenderness.   Musculoskeletal:        Right wrist: She exhibits normal range of motion and no tenderness.        Left wrist: She exhibits normal range of motion and no tenderness.   Lymphadenopathy:        Head (right side): No submental and no submandibular adenopathy present.        Head (left side): No submental and no submandibular adenopathy present.   Neurological: She is alert and oriented to person, place, and time.   Skin: Skin is warm and dry. No rash noted. She is not diaphoretic. No erythema.   Psychiatric: She has a normal mood and affect. Her behavior is normal.   Nursing note and vitals reviewed.        Labs/imaging; reviewed  Assessment:       1. Abnormal CT of the abdomen    2. Screening for malignant neoplasm of colon        Plan:   1. Prep was inadequate, will plan for repeat examination; ensure healing and for screening

## 2019-12-09 NOTE — PATIENT INSTRUCTIONS
SUPREP Instructions    You are scheduled for a colonoscopy with Dr. Tabor on 12/31/19  at [facility]  To ensure that your test is accurate and complete, you MUST follow these instructions listed below.  If you have any questions, please call our office at [phone].  Plan on being at the hospital for your procedure for 3-4 hours.    1.  Follow a CLEAR LIQUID DIET for the entire day before your scheduled colonoscopy.  This means no solid food the entire day starting when you wake.  You may have as much of the clear liquids as you want throughout the day.   CLEAR LIQUID DIET:   - Avoid Red, Orange, Purple, and/or Blue food coloring   - NO DAIRY   - You can have:  Coffee with sugar (no creamer), tea, water, soda, apple or white grape juice, chicken or beef broth/bouillon (no meat, noodles, or veggies), green/yellow popsicles, green/yellow Jell-O, lemonade.    2.  AT 5 pm the evening before your colonoscopy, POUR ONE (1) BOTTLE OF SUPREP INTO THE MIXING CONTAINER, PROVIDED INSIDE THE BOX.  ADD WATER TO THE LINE ON THE CONTAINER AND MIX IT WELL.  DRINK THE ENTIRE CONTAINER AND THEN DRINK TWO (2) MORE CONTAINERS OF WATER OVER THE NEXT 1 HOUR.  This is sometimes easier to drink if this solution is cold, so you can mix the solution 20 minutes ahead of time and place in the refrigerator prior to drinking.  You have to drink the solution within 30-45 minutes of mixing it.  Do NOT put this solution over ice.  It IS ok to drink with a straw.    3.  The endoscopy department will call you 1 day before your colonoscopy to tell you the exact time to arrive, AND to tell you the exact time to drink the 2nd portion of your prep (which will be FIVE HOURS BEFORE YOUR ARRIVAL TIME).  At this time given to you, POUR ONE (1) BOTTLE OF SUPREP INTO THE MIXING CONTAINER, PROVIDED INSIDE THE BOX.  ADD WATER TO THE LINE ON THE CONTAINER AND MIX IT WELL.  DRINK THE ENTIRE CONTAINER AND THEN DRINK TWO (2) MORE CONTAINERS OF WATER OVER THE  NEXT 1 HOUR.  This is sometimes easier to drink if this solution is cold, so you can mix the solution 20 minutes ahead of time and place in the refrigerator prior to drinking.  You have to drink the solution within 30-45 minutes of mixing it.  Do NOT put this solution over ice.  It IS ok to drink with a straw.  Once this is complete, you may not have ANYTHING else by mouth!    4.  You must have someone with you to DRIVE YOU HOME since you will be receiving IV sedation for the colonoscopy.    5.  It is ok to take your heart, blood pressure, and seizure medications in the morning of your test with a SIP of water.  Hold other medications until after your procedure.  Do NOT have anything else to eat or drink the morning of your colonoscopy.  It is ok to brush your teeth.    6.  If you are on blood thinners THAT YOU HAVE BEEN INSTRUCTED TO HOLD BY YOUR DOCTOR FOR THIS PROCEDURE, then do NOT take this the morning of your colonoscopy.  Do NOT stop these medications on your own, they must be approved to be held by your doctor.  Your colonoscopy can NOT be done if you are on these medications.  Examples of blood thinners include: Coumadin, Aggrenox, Plavix, Pradaxa, Reapro, Pletal, Xarelto, Ticagrelor, Brilinta, Eliquis, and high dose aspirin (325 mg).  You do not have to stop baby aspirin 81 mg.    7.  IF YOU ARE DIABETIC:  NO INSULIN OR ORAL MEDICATIONS THE MORNING OF THE COLONOSCOPY.  TAKE ONLY HALF THE DOSE OF YOUR INSULIN THE DAY BEFORE THE COLONOSCOPY.  DO NOT TAKE ANY ORAL DIABETIC MEDICATIONS THE DAY BEFORE THE COLONOSCOPY.  IF YOU ARE AN INSULIN DEPENDENT DIABETIC WITH UNSTABLE BLOOD SUGARS, NOTIFY YOUR PRIMARY CARE PHYSICIAN FOR INSTRUCTIONS.

## 2019-12-26 ENCOUNTER — HOSPITAL ENCOUNTER (EMERGENCY)
Facility: HOSPITAL | Age: 59
Discharge: HOME | End: 2019-12-26
Attending: EMERGENCY MEDICINE
Payer: MEDICAID

## 2019-12-26 VITALS
DIASTOLIC BLOOD PRESSURE: 60 MMHG | HEIGHT: 63 IN | RESPIRATION RATE: 20 BRPM | HEART RATE: 106 BPM | OXYGEN SATURATION: 96 % | WEIGHT: 145 LBS | BODY MASS INDEX: 25.69 KG/M2 | TEMPERATURE: 99 F | SYSTOLIC BLOOD PRESSURE: 132 MMHG

## 2019-12-26 DIAGNOSIS — M54.9 BACK PAIN, UNSPECIFIED BACK LOCATION, UNSPECIFIED BACK PAIN LATERALITY, UNSPECIFIED CHRONICITY: ICD-10-CM
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
## 2019-12-26 PROCEDURE — 25000003 PHARM REV CODE 250: Performed by: NURSE PRACTITIONER

## 2019-12-26 PROCEDURE — 99283 EMERGENCY DEPT VISIT LOW MDM: CPT | Mod: 25

## 2019-12-26 RX ORDER — IBUPROFEN 600 MG/1
600 TABLET ORAL
Status: COMPLETED | OUTPATIENT
Start: 2019-12-26 | End: 2019-12-26

## 2019-12-26 RX ADMIN — IBUPROFEN 600 MG: 600 TABLET, FILM COATED ORAL at 07:12

## 2019-12-27 ENCOUNTER — TELEPHONE (OUTPATIENT)
Dept: ENDOSCOPY | Facility: HOSPITAL | Age: 59
End: 2019-12-27

## 2019-12-27 NOTE — ED TRIAGE NOTES
"Pt presents to ED with c/o pain to lower right side of back resulting from a fall on yesterday. Pt states she "was walking on ceramic tile with socks on and I slipped". Pt denies head trauma or LOC. Pt rates pain 6 on 1/10 pain scale; but states "pain worsens to 10 when I move". Pt fidgety and moving around bed continuously during assessment.  "

## 2019-12-27 NOTE — DISCHARGE INSTRUCTIONS
Take over-the-counter ibuprofen or Tylenol as labeled as needed for pain. Follow-up with PCP in 2-3 days return to ED for any concerns or worsening symptoms.

## 2019-12-27 NOTE — ED NOTES
Contacted radiology for pt xray status; updated pt awaiting xray in approx 15 minutes. Pt verbalized understanding and cont talking with family member at bedside.

## 2019-12-27 NOTE — ED PROVIDER NOTES
"Encounter Date: 12/26/2019       History     Chief Complaint   Patient presents with    Fall     Patient presents to the ED with reports of having lower back pain after falling "this morning walking on ceramic tile with my socks". States pain has worsened throughout the day".      59-year-old female presents ED with lower back pain status post fall this morning.  Patient states that she had a mechanical fall this morning while "walking on ceramic tile with my socks."  Denies hitting head and LOC.  Patient states the pain is worsened throughout the day.  Patient reports taking ibuprofen approximately 5 hr ago with no improvement.  Denies bowel/bladder incontinence.  Patient is taking methadone.  States the pain is worse with movement and palpation.  Denies any alleviating factors.  Denies fever, chills, neck pain/stiffness, nausea, vomiting, dysuria, weakness, tingling, any other concerns.    The history is provided by the patient.     Review of patient's allergies indicates:  No Known Allergies  Past Medical History:   Diagnosis Date    Hypertension     Methadone dependence      Past Surgical History:   Procedure Laterality Date    CHOLECYSTECTOMY  5/18/14     Laparoscopic [facility]olecyst[facility] 5/18/14 [facility]    COLONOSCOPY N/A 11/12/2019    Procedure: COLONOSCOPY;  Surgeon: Gila Tabor MD;  Location: [facility];  Service: Endoscopy;  Laterality: N/A;    MASS EXCISION Right 1998    Thigh fatty cyst     Family History   Problem Relation Age of Onset    Cancer Mother      Social History     Tobacco Use    Smoking status: Current Every Day Smoker     Packs/day: 0.25     Years: 46.00     Pack years: 11.50     Types: Cigarettes     Start date: 1973    Smokeless tobacco: Never Used    Tobacco comment: Pt enrolled in [employer].  Ambulatory referral to Smoking Cessation program.    Substance Use Topics    Alcohol use: No    Drug use: No     Comment: currently on [employer]     Review of Systems "   Constitutional: Negative for fever.   Gastrointestinal: Negative for nausea and vomiting.   Genitourinary: Negative for dysuria and hematuria.   Musculoskeletal: Positive for back pain. Negative for gait problem, neck pain and neck stiffness.   Neurological: Negative for weakness and numbness.   Hematological: Does not bruise/bleed easily.   All other systems reviewed and are negative.      Physical Exam     Initial Vitals [12/26/19 1925]   BP Pulse Resp Temp SpO2   132/60 106 20 98.6 °F (37 °C) 96 %      MAP       --         Physical Exam    Vitals reviewed.  Constitutional: She appears well-developed and well-nourished.  Non-toxic appearance. She does not have a sickly appearance.   Patient jerky at baseline.    HENT:   Head: Atraumatic.   No signs of basilar fracture.   Eyes: EOM are normal.   Neck: Normal range of motion, full passive range of motion without pain and phonation normal. Neck supple.   Cardiovascular: Regular rhythm. Tachycardia present.    Pulmonary/Chest: No respiratory distress.   Musculoskeletal:        Lumbar back: She exhibits tenderness and pain. She exhibits normal range of motion, no bony tenderness, no swelling, no edema, no deformity, no laceration, no spasm and normal pulse.        Back:    Sensation and strength intact in BLE.  No bony tenderness, crepitus, or obvious deformity.  No overlying skin changes.  Full ROM.   Neurological: She is alert and oriented to person, place, and time. She has normal strength. No sensory deficit. Gait normal.   No focal neurological deficits.    Skin: Skin is warm. No rash noted.   Psychiatric: Her mood appears anxious.         ED Course   Procedures  Labs Reviewed - No data to display       Imaging Results          X-Ray Lumbar Spine Ap And Lateral (Final result)  Result time 12/26/19 20:37:21    Final result by Dennis Medrano MD (12/26/19 20:37:21)                 Impression:      1. Interval anterior superior vertebral body height loss  "involving T12, new since examination 11/10/2019, correlation with focal tenderness advised.  Please see above for additional findings.      Electronically signed by: Dennis Medrano MD  Date:    12/26/2019  Time:    20:37             Narrative:    EXAMINATION:  XR LUMBAR SPINE AP AND LATERAL    CLINICAL HISTORY:  Low back pain, minor trauma;    TECHNIQUE:  AP, lateral and spot images were performed of the lumbar spine.    COMPARISON:  11/10/2019    FINDINGS:  Three views.    There is straightening of the lumbar lordosis.  There is disc space height loss at L5-S1.  There is anterior superior vertebral body height loss involving T12, new since CT 11/10/2019, correlation with any focal tenderness advised.  There is lower lumbar facet arthropathy.  The sacral segments are grossly aligned allowing for positioning.  AP spinal alignment is remarkable for levo scoliotic curvature.  The bilateral sacroiliac joints are grossly intact allowing for positioning.  Surgical change projects over the right upper quadrant.                                 Medical Decision Making:   History:   Old Medical Records: I decided to obtain old medical records.  Initial Assessment:   59-year-old female presents ED with lower back pain status post fall this morning.  Patient states that she had a mechanical fall this morning while "walking on ceramic tile with my socks."  Denies hitting head and LOC.  Patient states the pain is worsened throughout the day.  Patient reports taking ibuprofen approximately 5 hr ago with no improvement.  Denies bowel/bladder incontinence.  Patient is taking methadone.  States the pain is worse with movement and palpation.  Denies any alleviating factors.  Denies fever, chills, neck pain/stiffness, nausea, vomiting, dysuria, weakness, tingling, any other concerns.    Clinical Tests:   Radiological Study: Reviewed and Ordered  ED Management:  X-ray shows no acute abnormalities.  No signs of cord compression or cauda " equina.  Patient's signs symptoms most likely due to musculoskeletal sprain/strain.  Patient is hemodynamically stable be discharged home.  Instructed take over-the-counter Tylenol or ibuprofen as labeled as needed for pain. Instructed to follow up with PCP in 2-3 days and to return to ED for any concerns or worsening symptoms.      After taking into careful account the historical factors and physical exam findings of the patient's presentation today, in conjunction with the empirical and objective data obtained on ED workup, no acute emergent medical condition requiring admission has been identified. The patient appears to be low risk for an emergent medical condition and I feel it is safe and appropriate at this time for the patient to be discharged to follow-up as detailed in their discharge instructions for reevaluation and possible continued outpatient workup and management. I have discussed the specifics of the workup with the patient and the patient has verbalized understanding of the details of the workup, the diagnosis, the treatment plan, and the need for outpatient follow-up.The patient remained comfortable and stable during their visit in the ED.  Discharge and follow-up instructions discussed with the patient who expressed understanding and willingness to comply with my recommendations.     This medical record was prepared using voice dictation software. There may be phonetic errors.                                 Clinical Impression:       ICD-10-CM ICD-9-CM   1. Fall, initial encounter W19.XXXA E888.9   2. Back pain, unspecified back location, unspecified back pain laterality, unspecified chronicity M54.9 724.5           I, Jose Irving, personally performed the services described in this documentation. All medical record entries made by the scribe were at my direction and in my presence.  I have reviewed the chart and agree that the record reflects my personal performance and is accurate and  complete. LETI Johnson.  8:52 PM 12/26/2019                   Jose Irving, MARITA  12/26/19 2052

## 2019-12-27 NOTE — TELEPHONE ENCOUNTER
Spoke with significant other. Unable to confirm arrival time and instruction. Please call back on Monday to confirm arrival time and instructions.

## 2020-01-17 ENCOUNTER — TELEPHONE (OUTPATIENT)
Dept: GASTROENTEROLOGY | Facility: CLINIC | Age: 60
End: 2020-01-17

## 2020-01-17 NOTE — TELEPHONE ENCOUNTER
----- Message from [employer] Eleazar sent at 1/17/2020  9:38 AM CST -----  Contact: [phone]/Sami  Type:  Sooner Apoointment Request    Caller is requesting a sooner appointment.  Caller declined first available appointment listed below.  Caller will not accept being placed on the waitlist and is requesting a message be sent to doctor.  Name of Caller:patient    When is the first available appointment? No appointment available patient has medicaid    Symptoms:stomach pains  Would the patient rather a call back or a response via [facility]? callback  Best Call Back Number:[phone]  Additional Information: none

## 2020-01-21 ENCOUNTER — TELEPHONE (OUTPATIENT)
Dept: ENDOSCOPY | Facility: HOSPITAL | Age: 60
End: 2020-01-21

## 2020-01-22 ENCOUNTER — TELEPHONE (OUTPATIENT)
Dept: ENDOSCOPY | Facility: HOSPITAL | Age: 60
End: 2020-01-22

## 2020-01-22 ENCOUNTER — NURSE TRIAGE (OUTPATIENT)
Dept: ADMINISTRATIVE | Facility: CLINIC | Age: 60
End: 2020-01-22

## 2020-01-22 NOTE — TELEPHONE ENCOUNTER
Spoke with patient about arrival time @0730.     Prep instructions reviewed: the day before the procedure, follow a clear liquid diet all day, then start the first 1/2 of prep at 5pm and take 2nd 1/2 of prep @0203.  Pt must be completely NPO when prep completed @0430.              Medications: Do not take Insulin or oral diabetic medications the day of the procedure.  Take as prescribed: heart, seizure and blood pressure medication in the morning with a sip of water (less than an ounce).  Take any breathing medications and bring inhalers to hospital with you Leave all valuables and jewelry at home.     Wear comfortable clothes to procedure to change into hospital gown You cannot drive for 24 hours after your procedure because you will receive sedation for your procedure to make you comfortable.  A ride must be provided at discharge.

## 2020-01-23 ENCOUNTER — ANESTHESIA EVENT (OUTPATIENT)
Dept: GASTROENTEROLOGY | Facility: CLINIC | Age: 60
End: 2020-01-23
Payer: MEDICAID

## 2020-01-23 ENCOUNTER — TELEPHONE (OUTPATIENT)
Dept: GASTROENTEROLOGY | Facility: CLINIC | Age: 60
End: 2020-01-23

## 2020-01-23 ENCOUNTER — ANESTHESIA (OUTPATIENT)
Dept: GASTROENTEROLOGY | Facility: CLINIC | Age: 60
End: 2020-01-23
Payer: MEDICAID

## 2020-01-23 NOTE — ANESTHESIA PREPROCEDURE EVALUATION
01/23/2020  Yu Meza is a 59 y.o., female for [employer]    ANES-RELATED MED/SURG  Patient Active Problem List   Diagnosis    S/P laparoscopic cholecystectomy    Methadone dependence    Essential hypertension, benign    Major depressive disorder    Abnormal liver enzymes    Diarrhea of presumed infectious origin    Hypophosphatemia    Hypomagnesemia    Hypokalemia    Ileitis     Past Medical History:   Diagnosis Date    Hypertension     Methadone dependence      Past Surgical History:   Procedure Laterality Date    CHOLECYSTECTOMY  5/18/14     Laparoscopic Cholecystectomy 5/18/14 [facility]    COLONOSCOPY N/A 11/12/2019    Procedure: COLONOSCOPY;  Surgeon: Gila Tabor MD;  Location: [facility] ENDO;  Service: Endoscopy;  Laterality: N/A;    MASS EXCISION Right 1998    Thigh fatty cyst     ALLERGIES  Review of patient's allergies indicates:  No Known Allergies    ANES-RELATED MAR [id number]  methadone    Pre-op Assessment    I have reviewed the Patient Summary Reports.     I have reviewed the Nursing Notes.   I have reviewed the Medications.     Review of Systems  Anesthesia Hx:  History of prior surgery of interest to airway management or planning:  Denies Personal Hx of Anesthesia complications.   Hematology/Oncology:     Oncology Normal     Cardiovascular:   Hypertension    Psych:   methadone       Physical Exam  General:  Well nourished    Airway/Jaw/Neck:  Airway Findings: Mouth Opening: Normal Tongue: Normal     Eyes/Ears/Nose:  EYES/EARS/NOSE FINDINGS: Normal    Chest/Lungs:  Chest/Lungs Clear    Heart/Vascular:  Heart Findings: Normal       Mental Status:  Mental Status Findings: Normal     CXR    EKG [id number]  Normal sinus rhythm  Biatrial enlargement  Abnormal ECG  When compared with ECG of 30-JUL-2015 14:44,  No significant change was found  Confirmed    ECHO    Anesthesia  Plan  Type of Anesthesia, risks & benefits discussed:  Anesthesia Type:  MAC  Patient's Preference:   Intra-op Monitoring Plan:   Intra-op Monitoring Plan Comments:   Post Op Pain Control Plan:   Post Op Pain Control Plan Comments:   Induction:    Beta Blocker:  Patient is not currently on a Beta-Blocker (No further documentation required).       Informed Consent: Patient understands risks and agrees with Anesthesia plan.  Questions answered. Anesthesia consent signed with patient.  ASA Score: 2     Day of Surgery Review of History & Physical:            Ready For Surgery From Anesthesia Perspective.

## 2020-01-23 NOTE — TELEPHONE ENCOUNTER
States she drank the initial prep solution at 6 pm and vomit about 10 min later. Advised to continue prep as ordered starting a little earlier to allow time to consume in smaller increments. Advised to call back if not passing clear stools in the am.    Reason for Disposition   Caller has medication question only, adult not sick, and triager answers question    Protocols used: MEDICATION QUESTION CALL-A-AH

## 2020-01-28 DIAGNOSIS — R93.5 ABNORMAL CT OF THE ABDOMEN: ICD-10-CM

## 2020-01-28 DIAGNOSIS — Z12.11 SCREENING FOR MALIGNANT NEOPLASM OF COLON: Primary | ICD-10-CM

## 2020-01-30 ENCOUNTER — ANESTHESIA (OUTPATIENT)
Dept: ENDOSCOPY | Facility: HOSPITAL | Age: 60
End: 2020-01-30
Payer: MEDICAID

## 2020-01-30 ENCOUNTER — HOSPITAL ENCOUNTER (OUTPATIENT)
Facility: HOSPITAL | Age: 60
Discharge: HOME | End: 2020-01-30
Attending: INTERNAL MEDICINE | Admitting: INTERNAL MEDICINE
Payer: MEDICAID

## 2020-01-30 ENCOUNTER — ANESTHESIA EVENT (OUTPATIENT)
Dept: ENDOSCOPY | Facility: HOSPITAL | Age: 60
End: 2020-01-30
Payer: MEDICAID

## 2020-01-30 VITALS
WEIGHT: 143 LBS | HEIGHT: 63 IN | HEART RATE: 67 BPM | TEMPERATURE: 98 F | RESPIRATION RATE: 15 BRPM | BODY MASS INDEX: 25.34 KG/M2 | OXYGEN SATURATION: 100 % | DIASTOLIC BLOOD PRESSURE: 47 MMHG | SYSTOLIC BLOOD PRESSURE: 111 MMHG

## 2020-01-30 DIAGNOSIS — Z12.11 SCREENING FOR MALIGNANT NEOPLASM OF COLON: Primary | ICD-10-CM

## 2020-01-30 PROCEDURE — 37000008 HC ANESTHESIA 1ST 15 MINUTES: Performed by: INTERNAL MEDICINE

## 2020-01-30 PROCEDURE — 00811 ANES LWR INTST NDSC NOS: CPT | Performed by: INTERNAL MEDICINE

## 2020-01-30 PROCEDURE — 45380 COLONOSCOPY AND BIOPSY: CPT | Mod: ,,, | Performed by: INTERNAL MEDICINE

## 2020-01-30 PROCEDURE — 45380 COLONOSCOPY AND BIOPSY: CPT | Performed by: INTERNAL MEDICINE

## 2020-01-30 PROCEDURE — 88305 TISSUE EXAM BY PATHOLOGIST: ICD-10-PCS | Mod: 26,,, | Performed by: PATHOLOGY

## 2020-01-30 PROCEDURE — 63600175 PHARM REV CODE 636 W HCPCS: Performed by: NURSE ANESTHETIST, CERTIFIED REGISTERED

## 2020-01-30 PROCEDURE — 27201012 HC FORCEPS, HOT/COLD, DISP: Performed by: INTERNAL MEDICINE

## 2020-01-30 PROCEDURE — 63600175 PHARM REV CODE 636 W HCPCS: Performed by: INTERNAL MEDICINE

## 2020-01-30 PROCEDURE — 45380 PR COLONOSCOPY,BIOPSY: ICD-10-PCS | Mod: ,,, | Performed by: INTERNAL MEDICINE

## 2020-01-30 PROCEDURE — 88305 TISSUE EXAM BY PATHOLOGIST: CPT | Mod: 26,,, | Performed by: PATHOLOGY

## 2020-01-30 PROCEDURE — 88305 TISSUE EXAM BY PATHOLOGIST: CPT | Performed by: PATHOLOGY

## 2020-01-30 PROCEDURE — 37000009 HC ANESTHESIA EA ADD 15 MINS: Performed by: INTERNAL MEDICINE

## 2020-01-30 RX ORDER — PROPOFOL 10 MG/ML
INJECTION, EMULSION INTRAVENOUS CONTINUOUS PRN
Status: DISCONTINUED | OUTPATIENT
Start: 2020-01-30 | End: 2020-01-30

## 2020-01-30 RX ORDER — PROPOFOL 10 MG/ML
INJECTION, EMULSION INTRAVENOUS
Status: DISCONTINUED | OUTPATIENT
Start: 2020-01-30 | End: 2020-01-30

## 2020-01-30 RX ORDER — SODIUM CHLORIDE 9 MG/ML
INJECTION, SOLUTION INTRAVENOUS CONTINUOUS
Status: DISCONTINUED | OUTPATIENT
Start: 2020-01-30 | End: 2020-01-30 | Stop reason: HOSPADM

## 2020-01-30 RX ORDER — SODIUM CHLORIDE 0.9 % (FLUSH) 0.9 %
10 SYRINGE (ML) INJECTION
Status: DISCONTINUED | OUTPATIENT
Start: 2020-01-30 | End: 2020-01-30 | Stop reason: HOSPADM

## 2020-01-30 RX ORDER — LIDOCAINE HCL/PF 100 MG/5ML
SYRINGE (ML) INTRAVENOUS
Status: DISCONTINUED | OUTPATIENT
Start: 2020-01-30 | End: 2020-01-30

## 2020-01-30 RX ADMIN — PROPOFOL 80 MG: 10 INJECTION, EMULSION INTRAVENOUS at 08:01

## 2020-01-30 RX ADMIN — PROPOFOL 150 MCG/KG/MIN: 10 INJECTION, EMULSION INTRAVENOUS at 08:01

## 2020-01-30 RX ADMIN — Medication 50 MG: at 08:01

## 2020-01-30 RX ADMIN — SODIUM CHLORIDE: 0.9 INJECTION, SOLUTION INTRAVENOUS at 08:01

## 2020-01-30 NOTE — H&P
[facility]  Gastroenterology  H&P    Patient Name: Yu Meza  MRN: [id number]  Admission Date: 1/30/2020  Code Status: Prior    Attending Provider: Gila Tabor MD   Primary Care Physician: Keisha Silva NP  Principal Problem:<principal problem not specified>    Subjective:     History of Present Illness: Colon cancer screening    Past Medical History:   Diagnosis Date    Hypertension     Methadone dependence        Past Surgical History:   Procedure Laterality Date    CHOLECYSTECTOMY  5/18/14     Laparoscopic Cholecystectomy 5/18/14 [facility]    COLONOSCOPY N/A 11/12/2019    Procedure: COLONOSCOPY;  Surgeon: Gila Tabor MD;  Location: [facility];  Service: Endoscopy;  Laterality: N/A;    MASS EXCISION Right 1998    Thigh fatty cyst       Review of patient's allergies indicates:  No Known Allergies  Family History     Problem Relation (Age of Onset)    Cancer Mother        Tobacco Use    Smoking status: Current Every Day Smoker     Packs/day: 0.25     Years: 46.00     Pack years: 11.50     Types: Cigarettes     Start date: 1973    Smokeless tobacco: Never Used    Tobacco comment: Pt enrolled in [employer].  Ambulatory referral to Smoking Cessation program.    Substance and Sexual Activity    Alcohol use: No    Drug use: No     Comment: currently on metadone    Sexual activity: Yes     Partners: Male     Review of Systems   Constitutional: Negative for appetite change and unexpected weight change.   Respiratory: Negative for apnea and chest tightness.    Cardiovascular: Negative for chest pain and palpitations.   Gastrointestinal: Negative for abdominal distention and abdominal pain.     Objective:     Vital Signs (Most Recent):    Vital Signs (24h Range):           There is no height or weight on file to calculate BMI.    No intake or output data in the 24 hours ending 01/30/20 0749    Lines/Drains/Airways     None                 Physical Exam   Constitutional:  She is oriented to person, place, and time. She appears well-developed and well-nourished. No distress.   HENT:   Head: Normocephalic and atraumatic.   Eyes: Conjunctivae are normal. No scleral icterus.   Neck: Normal range of motion. Neck supple. No tracheal deviation present. No thyromegaly present.   Cardiovascular: Normal rate and regular rhythm. Exam reveals no gallop and no friction rub.   No murmur heard.  Pulmonary/Chest: Effort normal and breath sounds normal. No respiratory distress. She has no wheezes.   Abdominal: Soft. Bowel sounds are normal. She exhibits no distension. There is no tenderness.   Musculoskeletal:        Right wrist: She exhibits normal range of motion and no tenderness.        Left wrist: She exhibits normal range of motion and no tenderness.   Lymphadenopathy:        Head (right side): No submental and no submandibular adenopathy present.        Head (left side): No submental and no submandibular adenopathy present.   Neurological: She is alert and oriented to person, place, and time.   Skin: Skin is warm and dry. No rash noted. She is not diaphoretic. No erythema.   Psychiatric: She has a normal mood and affect. Her behavior is normal.   Nursing note and vitals reviewed.        Assessment/Plan:   - Colon cancer screening    Plan  1. Colonoscopy    Gila Tabor MD  Gastroenterology  [facility]

## 2020-01-30 NOTE — ANESTHESIA POSTPROCEDURE EVALUATION
Anesthesia Post Evaluation    Patient: Yu Meza    Procedure(s) Performed: Procedure(s) (LRB):  COLONOSCOPY/Miralax (N/A)    Final Anesthesia Type: MAC    Patient location during evaluation: GI PACU  Patient participation: Yes- Able to Participate  Level of consciousness: awake and alert and oriented  Post-procedure vital signs: reviewed and stable  Pain management: adequate  Airway patency: patent    PONV status at discharge: No PONV  Anesthetic complications: no      Cardiovascular status: blood pressure returned to baseline, hemodynamically stable and stable  Respiratory status: unassisted, spontaneous ventilation and room air  Hydration status: euvolemic  Follow-up not needed.          Vitals Value Taken Time   /68 1/30/2020  7:58 AM   Temp 36.6 °C (97.9 °F) 1/30/2020  7:58 AM   Pulse 80 1/30/2020  7:58 AM   Resp 18 1/30/2020  7:58 AM   SpO2 96 % 1/30/2020  7:58 AM         No case tracking events are documented in the log.      Pain/Satinder Score: No data recorded

## 2020-01-30 NOTE — ANESTHESIA PREPROCEDURE EVALUATION
01/30/2020  Yu Meza is a 59 y.o., female having a screening lower endo.   PMH - HTN, DJD (lumbar, cervical), abnormal abd CT.    Anesthesia Evaluation    I have reviewed the Patient Summary Reports.    I have reviewed the Nursing Notes.   I have reviewed the Medications.     Review of Systems  Anesthesia Hx:  No problems with previous Anesthesia   Denies Personal Hx of Anesthesia complications.   Social:  Smoker    Cardiovascular:   Hypertension    Psych:   Psychiatric History          Physical Exam  General:  Well nourished    Airway/Jaw/Neck:  Airway Findings: Mouth Opening: Normal Tongue: Normal  General Airway Assessment: Adult  Mallampati: IV  Improves to III with phonation.  TM Distance: Normal, at least 6 cm  Jaw/Neck Findings:  Neck ROM: Normal ROM       Chest/Lungs:  Chest/Lungs Findings: Clear to auscultation, Normal Respiratory Rate     Heart/Vascular:  Heart Findings: Rate: Normal  Rhythm: Regular Rhythm  Sounds: Normal        Mental Status:  Mental Status Findings:  Alert and Oriented         Anesthesia Plan  Type of Anesthesia, risks & benefits discussed:  Anesthesia Type:  MAC  Patient's Preference:   Intra-op Monitoring Plan:   Intra-op Monitoring Plan Comments:   Post Op Pain Control Plan:   Post Op Pain Control Plan Comments:   Induction:   IV  Beta Blocker:         Informed Consent: Patient understands risks and agrees with Anesthesia plan.  Questions answered. Anesthesia consent signed with patient.  ASA Score: 2     Day of Surgery Review of History & Physical: I have interviewed and examined the patient. I have reviewed the patient's H&P dated:            Ready For Surgery From Anesthesia Perspective.

## 2020-01-30 NOTE — TRANSFER OF CARE
"Anesthesia Transfer of Care Note    Patient: Yu Meza    Procedure(s) Performed: Procedure(s) (LRB):  COLONOSCOPY/Miralax (N/A)    Patient location: GI    Anesthesia Type: MAC    Transport from OR: Transported from OR on room air with adequate spontaneous ventilation    Post pain: adequate analgesia    Post assessment: no apparent anesthetic complications and tolerated procedure well    Post vital signs: stable    Level of consciousness: awake, alert and oriented    Nausea/Vomiting: no nausea/vomiting    Complications: none    Transfer of care protocol was followed      Last vitals:   Visit Vitals  /68 (BP Location: Right arm, Patient Position: Sitting)   Pulse 80   Temp 36.6 °C (97.9 °F) (Skin)   Resp 18   Ht 5' 3" (1.6 m)   Wt 64.9 kg (143 lb)   SpO2 96%   Breastfeeding? No   BMI 25.33 kg/m²     "

## 2020-01-30 NOTE — DISCHARGE INSTRUCTIONS
4 Steps for Eating Healthier  Changing the way you eat can improve your health. It can lower your cholesterol and blood pressure, and help you stay at a healthy weight. Your diet doesnt have to be bland and boring to be healthy. Just watch your calories and follow these steps:    1. Eat fewer unhealthy fats  · Choose more fish and lean meats instead of fatty cuts of meat.  · Skip butter and lard, and use less margarine.  · Pass on foods that have palm, coconut, or hydrogenated oils.  · Eat fewer high-fat dairy foods like cheese, ice cream, and whole milk.  · Get a heart-healthy cookbook and try some low-fat recipes.  2. Go light on salt  · Keep the saltshaker off the table.  · Limit high-salt ingredients, such as soy sauce, bouillon, and garlic salt.  · Instead of adding salt when cooking, season your food with herbs and flavorings. Try lemon, garlic, and onion.  · Limit convenience foods, such as boxed or canned foods and restaurant food.  · Read food labels and choose lower-sodium options.  3. Limit sugar  · Pause before you add sugars to pancakes, cereal, coffee, or tea. This includes white and brown table sugar, syrup, honey, and molasses. Cut your usual amount by half.  · Use non-sugar sweeteners. Stevia, aspartame, and sucralose can satisfy a sweet tooth without adding calories.  · Swap out sugar-filled soda and other drinks. Buy sugar-free or low-calorie beverages. Remember water is always the best choice.  · Read labels and choose foods with less added sugar. Keep in mind that dairy foods and foods with fruit will have some natural sugar.  · Cut the sugar in recipes by 1/3 to 1/2. Boost the flavor with extracts like almond, vanilla, or orange. Or add spices such as cinnamon or nutmeg.  4. Eat more fiber  · Eat fresh fruits and vegetables every day.  · Boost your diet with whole grains. Go for oats, whole-grain rice, and bran.  · Add beans and lentils to your meals.  · Drink more water to match your fiber  increase. This is to help prevent constipation.  Date Last Reviewed: 5/11/2015  © 2426-2371 [employer]. [address] [place] [address], [place] [address]. All rights reserved. This information is not intended as a substitute for professional medical care. Always follow your healthcare professional's instructions.

## 2020-01-30 NOTE — [employer] PATIENT INSTRUCTIONS
Discharge Summary/Instructions after an Endoscopic Procedure  Patient Name: Yu Meza  Patient MRN: [id number]  Patient YOB: 1960 Thursday, January 30, 2020  Gila Tabor MD  RESTRICTIONS:  During your procedure today, you received medications for sedation.  These   medications may affect your judgment, balance and coordination.  Therefore,   for 24 hours, you have the following restrictions:   - DO NOT drive a car, operate machinery, make legal/financial decisions,   sign important papers or drink alcohol.    ACTIVITY:  Today: no heavy lifting, straining or running due to procedural   sedation/anesthesia.  The following day: return to full activity including work.  DIET:  Eat and drink normally unless instructed otherwise.     TREATMENT FOR COMMON SIDE EFFECTS:  - Mild abdominal pain, nausea, belching, bloating or excessive gas:  rest,   eat lightly and use a heating pad.  - Sore Throat: treat with throat lozenges and/or gargle with warm salt   water.  - Because air was used during the procedure, expelling large amounts of air   from your rectum or belching is normal.  - If a bowel prep was taken, you may not have a bowel movement for 1-3 days.    This is normal.  SYMPTOMS TO WATCH FOR AND REPORT TO YOUR PHYSICIAN:  1. Abdominal pain or bloating, other than gas cramps.  2. Chest pain.  3. Back pain.  4. Signs of infection such as: chills or fever occurring within 24 hours   after the procedure.  5. Rectal bleeding, which would show as bright red, maroon, or black stools.   (A tablespoon of blood from the rectum is not serious, especially if   hemorrhoids are present.)  6. Vomiting.  7. Weakness or dizziness.  GO DIRECTLY TO THE NEAREST EMERGENCY ROOM IF YOU HAVE ANY OF THE FOLLOWING:      Difficulty breathing              Chills and/or fever over 101 F   Persistent vomiting and/or vomiting blood   Severe abdominal pain   Severe chest pain   Black, tarry stools   Bleeding- more than one  tablespoon   Any other symptom or condition that you feel may need urgent attention  Your doctor recommends these additional instructions:  If any biopsies were taken, your doctors clinic will contact you in 1 to 2   weeks with any results.  - Discharge patient to home (via wheelchair).   - Patient has a contact number available for emergencies.  The signs and   symptoms of potential delayed complications were discussed with the   patient.  Return to normal activities tomorrow.  Written discharge   instructions were provided to the patient.   - Resume previous diet.   - Continue present medications.   - Await pathology results.   - Repeat colonoscopy in 10 years for screening purposes.  For questions, problems or results please call your physician - Gila Tabor MD at Work:  [phone].  EMERGENCY PHONE NUMBER: [phone],  LAB RESULTS: [phone]  IF A COMPLICATION OR EMERGENCY SITUATION ARISES AND YOU ARE UNABLE TO REACH   YOUR PHYSICIAN - GO DIRECTLY TO THE EMERGENCY ROOM.  Gila Tabor MD  1/30/2020 9:22:44 AM  This report has been verified and signed electronically.  [employer]

## 2020-02-04 LAB
FINAL PATHOLOGIC DIAGNOSIS: NORMAL
GROSS: NORMAL

## 2020-02-13 RX ORDER — DICYCLOMINE HYDROCHLORIDE 20 MG/1
20 TABLET ORAL EVERY 6 HOURS
Qty: 120 TABLET | Refills: 0 | Status: SHIPPED | OUTPATIENT
Start: 2020-02-13 | End: 2020-03-14

## 2020-07-24 ENCOUNTER — HOSPITAL ENCOUNTER (EMERGENCY)
Facility: HOSPITAL | Age: 60
Discharge: HOME | End: 2020-07-24
Attending: EMERGENCY MEDICINE
Payer: MEDICAID

## 2020-07-24 VITALS
BODY MASS INDEX: 23.39 KG/M2 | OXYGEN SATURATION: 95 % | TEMPERATURE: 98 F | WEIGHT: 132 LBS | RESPIRATION RATE: 20 BRPM | SYSTOLIC BLOOD PRESSURE: 161 MMHG | HEIGHT: 63 IN | HEART RATE: 80 BPM | DIASTOLIC BLOOD PRESSURE: 79 MMHG

## 2020-07-24 DIAGNOSIS — M54.32 SCIATICA OF LEFT SIDE: Primary | ICD-10-CM

## 2020-07-24 PROCEDURE — 99284 EMERGENCY DEPT VISIT MOD MDM: CPT | Mod: 25

## 2020-07-24 PROCEDURE — 96372 THER/PROPH/DIAG INJ SC/IM: CPT

## 2020-07-24 PROCEDURE — 25000003 PHARM REV CODE 250: Performed by: EMERGENCY MEDICINE

## 2020-07-24 PROCEDURE — 63600175 PHARM REV CODE 636 W HCPCS: Performed by: EMERGENCY MEDICINE

## 2020-07-24 RX ORDER — HYDROCODONE BITARTRATE AND ACETAMINOPHEN 5; 325 MG/1; MG/1
1 TABLET ORAL
Status: COMPLETED | OUTPATIENT
Start: 2020-07-24 | End: 2020-07-24

## 2020-07-24 RX ORDER — METHOCARBAMOL 500 MG/1
1000 TABLET, FILM COATED ORAL 3 TIMES DAILY PRN
Qty: 30 TABLET | Refills: 0 | Status: SHIPPED | OUTPATIENT
Start: 2020-07-24 | End: 2020-07-29

## 2020-07-24 RX ORDER — DEXAMETHASONE SODIUM PHOSPHATE 4 MG/ML
8 INJECTION, SOLUTION INTRA-ARTICULAR; INTRALESIONAL; INTRAMUSCULAR; INTRAVENOUS; SOFT TISSUE
Status: COMPLETED | OUTPATIENT
Start: 2020-07-24 | End: 2020-07-24

## 2020-07-24 RX ORDER — TRAMADOL HYDROCHLORIDE 50 MG/1
50 TABLET ORAL EVERY 6 HOURS PRN
Qty: 12 TABLET | Refills: 0 | Status: SHIPPED | OUTPATIENT
Start: 2020-07-24

## 2020-07-24 RX ADMIN — HYDROCODONE BITARTRATE AND ACETAMINOPHEN 1 TABLET: 5; 325 TABLET ORAL at 11:07

## 2020-07-24 RX ADMIN — DEXAMETHASONE SODIUM PHOSPHATE 8 MG: 4 INJECTION, SOLUTION INTRAMUSCULAR; INTRAVENOUS at 11:07

## 2020-07-24 NOTE — DISCHARGE INSTRUCTIONS
Additional instructions  Followup with your primary care physician in 1 week if you are not improving. If you are not improving you may need to follow up with a spine specialist. Take all your medications as prescribed. Return to the emergency department if you have increasing pain, cannot control your bowel movements or your urination, have numb legs, chest pain, difficulty breathing,  nonstop vomiting, or any other concerns. Be sure to drink plenty of fluids to stay hydrated. Get plenty of rest. Please refer to additional educational material for further instructions.

## 2020-07-24 NOTE — ED TRIAGE NOTES
Pt presents to ED with C/O L buttock pain that radiates down her leg. Pt states pain is 8/10 at this time. Pt states she was seen last week for a fall and had and X-ray that showed a compression Fx of the T 12. Pt states she has been alternating tylenol and Advil with little relief.

## 2020-07-24 NOTE — ED PROVIDER NOTES
Encounter Date: 7/24/2020    SCRIBE #1 NOTE: I, Roseanne Flores, am scribing for, and in the presence of,  Dr. Gutiérrez . I have scribed the entire note.     I, Dr. Vicente Gutiérrez MD, personally performed the services described in this documentation. All medical record entries made by the scribe were at my direction and in my presence.  I have reviewed the chart and agree that the record reflects my personal performance and is accurate and complete. Vicente Gutiérrez MD.    History       Chief Complaint   Patient presents with    Back Pain     with radiation to lt leg with back pain, pt fell 1 week ago diagnosed with compression  fracture.        HPI    Yu Meza 60 y.o. presents to the emergency department today with a complaint of back pain x 3 weeks since she fell.  It is located in the lower back.  Described as a shooting pain. There is radiation of symptoms down the left leg. The pt has no bowel or bladder incontinence, no numbness or tingling to the legs or arms, no gait abnormalities, no fever, no h/o cancer, no h/o IVDA, no unexpected weight loss. Patient recently suffered a T-12 compression fracture 3 weeks ago after a falling onto ceramic tile harmony.         ROS    Constitutional: No fever, no chills.  Eyes: No discharge. No pain.  HENT: No nasal drainage. No ear ache. No sore throat.  Cardiovascular: No chest pain, no palpitations.  Respiratory: No cough, no shortness of breath.  Gastrointestinal: No abdominal pain, no vomiting. No diarrhea.  Genitourinary: No hematuria, dysuria, urgency.  Musculoskeletal: See above.  Skin: No rashes, no lesions.  Neurological: No headache, no focal weakness.    Otherwise remaining ROS negative     The history is provided by the patient      ALLERGIES REVIEWED  MEDICATIONS REVIEWED  PMH/PSH/SOC/FH REVIEWED       Past Medical History:   Diagnosis Date    Hypertension     Methadone dependence          Past Surgical History:   Procedure Laterality Date     "CHOLECYSTECTOMY  5/18/14     Laparoscopic Cholecystectomy 5/18/14 [facility]    COLONOSCOPY N/A 11/12/2019    Procedure: COLONOSCOPY;  Surgeon: Gila Tabor MD;  Location: [facility] ENDO;  Service: Endoscopy;  Laterality: N/A;    COLONOSCOPY N/A 1/30/2020    Procedure: COLONOSCOPY/Miralax;  Surgeon: Gila Tabor MD;  Location: [facility] ENDO;  Service: Endoscopy;  Laterality: N/A;    MASS EXCISION Right 1998    Thigh fatty cyst         Social History     Tobacco Use    Smoking status: Current Every Day Smoker     Packs/day: 0.25     Years: 46.00     Pack years: 11.50     Types: Cigarettes     Start date: 1973    Smokeless tobacco: Never Used    Tobacco comment: Pt enrolled in [employer].  Ambulatory referral to Smoking Cessation program.    Substance Use Topics    Alcohol use: No    Drug use: Yes     Comment: currently on metadone         .    Nursing/Ancillary staff note reviewed.  VS reviewed         Physical Exam     BP (!) 161/79   Pulse 80   Temp 97.9 °F (36.6 °C) (Oral)   Resp 20   Ht 5' 3" (1.6 m)   Wt 59.9 kg (132 lb)   SpO2 95%   BMI 23.38 kg/m²     Physical Exam    General Appearance: The patient is alert, has no immediate need for airway protection and no signs of toxicity. No acute distress. Lying in bed but able to sit up without difficulty.   HEENT: Eyes: Pupils equal and round no pallor or injection. Extra ocular movements intact. No drainage.       Mouth: Mucous membranes are moist. Oropharynx clear.   Neck:Neck is supple non-tender. No lymphadenopathy. No stridor.   Respiratory: There are no retractions, lungs are clear to auscultation. No wheezing, no crackles. Chest wall nontender to palpation.   Cardiovascular: Regular rate and rhythm. No murmurs, rubs or gallops.  Gastrointestinal:  Abdomen is soft and non-tender, no masses, bowel sounds normal. No guarding, no rebound.  No pulsatile mass.   Neurological: Alert and oriented x 4. CN II-XII grossly intact. No focal weakness. " Strength intact 5/5 bilaterally in upper and lower extremities.   Skin: Warm and dry, no rashes.  Musculoskeletal:Extremities are non-tender, non-swollen and have full range of motion. Back: There is no midline tenderness to the C., T., L., sacral spine. There is no step-off or crepitus. There is no erythema or ecchymosis. There is no paraspinal tenderness in the thoracic or lumbar region. There is no muscle spasm. + Straight leg raise on the left.     DIFFERENTIAL DIAGNOSIS: After history and physical exam a differential diagnosis was considered, but was not limited to, muscular pain, herniated disc, spine fracture, abscess, intra-abdominal causes and urinary tract infection.            ED Course            MDM      Yu Meza  60 y.o. presents to the ED today with back pain with radiation down the left leg. + Straight leg raise. No red flags on exam. No midline tenderness, no need for imaging at this time. She had a previous xray with a T12 compression fracture. Will discharge home with symptom control and the pt will follow up with PCP. The pt is comfortable with this plan and comfortable going home at this time. After taking into careful account the historical factors and physical exam findings of the patient's presentation today, in conjunction with the empirical and objective data obtained on ED workup, no acute emergent medical condition requiring admission has been identified. The patient appears to be low risk for an emergent medical condition and I feel it is safe and appropriate at this time for the patient to be discharged to follow-up as detailed in their discharge instructions for reevaluation and possible continued outpatient workup and management. Regardless, an unremarkable evaluation in the ED does not preclude the development or presence of a serious or life threatening condition. As such, patient was instructed to return immediately for any worsening or change in current symptoms.  Precautions for return discussed at length.  Discharge and follow-up instructions discussed with the patient who expressed understanding and willingness to comply with my recommendations.    Voice recognition software utilized in this note.                The encounter diagnosis was Sciatica of left side.            Discharge Medication List as of 7/24/2020 12:16 PM      START taking these medications    Details   methocarbamoL (ROBAXIN) 500 MG Tab Take 2 tablets (1,000 mg total) by mouth 3 (three) times daily as needed (pain)., Starting Fri 7/24/2020, Until Wed 7/29/2020, Print      traMADoL (ULTRAM) 50 mg tablet Take 1 tablet (50 mg total) by mouth every 6 (six) hours as needed for Pain., Starting Fri 7/24/2020, Print                  Vicente Gutiérrez MD  07/24/20 [id number]

## 2024-09-11 NOTE — TELEPHONE ENCOUNTER
Left message for patient to call Endoscopy unit M-F, 8 am - 4 pm, for arrival time and prep instructions for procedure.     Patient has an arrival time of : _0930____ for _colon____ on _1/23/2020____ .   
stillbirth @ 32 wks, c/s x1

## 2025-07-02 DIAGNOSIS — Z12.31 ENCOUNTER FOR SCREENING MAMMOGRAM FOR MALIGNANT NEOPLASM OF BREAST: Primary | ICD-10-CM

## 2025-07-02 DIAGNOSIS — Z87.891 PERSONAL HISTORY OF TOBACCO USE, PRESENTING HAZARDS TO HEALTH: Primary | ICD-10-CM

## 2025-07-02 DIAGNOSIS — Z87.891 PERSONAL HISTORY OF SMOKING: Primary | ICD-10-CM
